# Patient Record
Sex: FEMALE | Race: BLACK OR AFRICAN AMERICAN | NOT HISPANIC OR LATINO | Employment: FULL TIME | URBAN - METROPOLITAN AREA
[De-identification: names, ages, dates, MRNs, and addresses within clinical notes are randomized per-mention and may not be internally consistent; named-entity substitution may affect disease eponyms.]

---

## 2017-05-08 ENCOUNTER — HOSPITAL ENCOUNTER (EMERGENCY)
Facility: HOSPITAL | Age: 37
Discharge: HOME | End: 2017-05-08
Attending: EMERGENCY MEDICINE
Payer: MEDICAID

## 2017-05-08 VITALS
OXYGEN SATURATION: 99 % | SYSTOLIC BLOOD PRESSURE: 164 MMHG | BODY MASS INDEX: 26.96 KG/M2 | RESPIRATION RATE: 18 BRPM | DIASTOLIC BLOOD PRESSURE: 72 MMHG | HEART RATE: 110 BPM | WEIGHT: 182 LBS | HEIGHT: 69 IN | TEMPERATURE: 99 F

## 2017-05-08 DIAGNOSIS — S82.891A ANKLE FRACTURE, RIGHT, CLOSED, INITIAL ENCOUNTER: Primary | ICD-10-CM

## 2017-05-08 PROCEDURE — 96372 THER/PROPH/DIAG INJ SC/IM: CPT

## 2017-05-08 PROCEDURE — 63600175 PHARM REV CODE 636 W HCPCS: Performed by: PHYSICIAN ASSISTANT

## 2017-05-08 PROCEDURE — 99283 EMERGENCY DEPT VISIT LOW MDM: CPT | Mod: 25

## 2017-05-08 RX ORDER — HYDROMORPHONE HYDROCHLORIDE 2 MG/ML
1 INJECTION, SOLUTION INTRAMUSCULAR; INTRAVENOUS; SUBCUTANEOUS
Status: COMPLETED | OUTPATIENT
Start: 2017-05-08 | End: 2017-05-08

## 2017-05-08 RX ADMIN — HYDROMORPHONE HYDROCHLORIDE 1 MG: 2 INJECTION INTRAMUSCULAR; INTRAVENOUS; SUBCUTANEOUS at 08:05

## 2017-05-08 NOTE — ED AVS SNAPSHOT
[facility]  [address] [place] [address]               Gina N Headland   2017  7:50 PM   ED    Description:  Female : 1980   Department:  [facility]           Your Care was Coordinated By:     Provider Role From To    Dillon Martin MD Attending Provider 17 --    Darcy Rivera PA-C Physician Assistant 17 --      Reason for Visit     Broken right ankle pain worse           Diagnoses this Visit        Comments    Ankle fracture, right, closed, initial encounter    -  Primary       ED Disposition     None           To Do List           Follow-up Information     Schedule an appointment as soon as possible for a visit with [facility].    Specialties:  Neurosurgery, Plastic Surgery, Podiatry, Surgical Oncology, Allergy, Cardiothoracic Surgery, Otolaryngology, Gastroenterology, Breast Surgery, Oral Surgery, Oral and Maxillofacial Surgery, Cardiology, Bariatrics, Internal Medicine, Family Medicine, Colon and Rectal Surgery, Dental General Practice, Gynecology, Orthopedic Surgery, Genetics, Endocrinology, Vascular Surgery, Physical Medicine and Rehabilitation, Urology, Neurology, Dermatology, Rheumatology    Why:  for follow up with Orthopedic Surgery     Contact information:     [facility] [address]  [phone]          Go to [facility].    Specialty:  Emergency Medicine    Why:  As needed, If symptoms worsen    Contact information:    [address] [place] [id number] [phone]      [facility]     [facility] Nurse Care Line - 24/ Assistance  Unless otherwise directed by your provider, please contact [facility], our nurse care line that is available for 24/7 assistance.     Registered nurses in the [facility] provide: appointment scheduling, clinical advisement, health education, and other advisory  "services.  Call: [phone] (toll free)               Medications           Message regarding Medications     Verify the changes and/or additions to your medication regime listed below are the same as discussed with your clinician today.  If any of these changes or additions are incorrect, please notify your healthcare provider.        These medications were administered today        Dose Freq    hydromorphone (PF) injection 1 mg 1 mg ED 1 Time    Sig: Inject 0.5 mLs (1 mg total) into the muscle ED 1 Time.    Class: Normal    Route: Intramuscular    Cosign for Ordering: Required by Dillon Martin MD           Verify that the below list of medications is an accurate representation of the medications you are currently taking.  If none reported, the list may be blank. If incorrect, please contact your healthcare provider. Carry this list with you in case of emergency.           Current Medications     hydromorphone (PF) injection 1 mg Inject 0.5 mLs (1 mg total) into the muscle ED 1 Time.    ibuprofen (ADVIL,MOTRIN) 600 MG tablet Take 1 tablet (600 mg total) by mouth every 6 (six) hours as needed.    oxycodone-acetaminophen (PERCOCET) 5-325 mg per tablet Take 1 tablet by mouth every 6 (six) hours as needed for Pain.    PNV with Ca,no.71-iron-FA 27-1 mg Tab Take 1 tablet by mouth once daily.           Clinical Reference Information           Your Vitals Were     BP Pulse Temp Resp Height Weight    164/72 (BP Location: Left arm, Patient Position: Sitting) 110 98.6 °F (37 °C) (Oral) 18 5' 9" (1.753 m) 82.6 kg (182 lb)    Last Period SpO2 BMI          04/21/2017 99% 26.88 kg/m2        Allergies as of 5/8/2017     No Known Allergies      Immunizations Administered on Date of Encounter - 5/8/2017     None      ED Micro, Lab, POCT     None      ED Imaging Orders     None        Discharge Instructions               Discharge References/Attachments     ANKLE FRACTURE, UNDERSTANDING (ENGLISH)    ANKLE FRACTURES, TREATING " (ENGLISH)      [facility] Sign-Up     Activating your [facility] account is as easy as 1-2-3!     1) Visit [email or web address], select Sign Up Now, enter this activation code and your date of birth, then select Next.  [email or web address]  Expires: 6/22/2017  8:15 PM      2) Create a username and password to use when you visit [facility] in the future and select a security question in case you lose your password and select Next.    3) Enter your e-mail address and click Sign Up!    Additional Information  If you have questions, please e-mail [email or web address] or call [phone] to talk to our [facility] staff. Remember, [facility] is NOT to be used for urgent needs. For medical emergencies, dial 911.          [facility] complies with applicable Federal civil rights laws and does not discriminate on the basis of race, color, national origin, age, disability, or sex.        Language Assistance Services     ATTENTION: Language assistance services are available, free of charge. Please call [phone].      ATENCIÓN: Si habla español, tiene a longoria disposición servicios gratuitos de asistencia lingüística. Llame al [phone].     CHÚ Ý: N?u b?n nói Ti?ng Vi?t, có các d?ch v? h? tr? ngôn ng? mi?n phí dành cho b?n. G?i s? [phone].

## 2017-05-09 NOTE — ED PROVIDER NOTES
"Encounter Date: 2017    SCRIBE #1 NOTE: I, Almita Toure, am scribing for, and in the presence of,  Darcy Rivera PA-C. I have scribed the following portions of the note - Other sections scribed: HPI/ROS.       History     Chief Complaint   Patient presents with    Broken right ankle pain worse     Pt reports she was seen at  on Sat and diagnosed with fx ankle. "Can't get in to see ortho yet she reports and out of pain med, and pain has worsened today" Short leg ortho glass splint in place to right leg     Review of patient's allergies indicates:  No Known Allergies  HPI Comments: CC: Ankle Pain    HPI: This 36 y.o. F who has history of HTN presents to the ED for evaluation of constant right ankle pain s/p ankle fracture for 3 days. The pt fell down the stairs 2 days ago and was diagnosed with an ankle fracture at [facility]. The pt reports intermittent swelling and muscle tightening in her right lower leg. She currenlty denies the swelling and tightening. The pt notes that she can wiggle her toes and has tactile sensation in her toes. She attempted treatment with the pain medication that she was prescribed at [facility] with no relief. The pt denies fever, chills, N/V/D, and any other associated symptoms.     The history is provided by the patient. No  was used.     Past Medical History:   Diagnosis Date    Hypertension      Past Surgical History:   Procedure Laterality Date     SECTION       Family History   Problem Relation Age of Onset    Cancer Mother     Breast cancer Neg Hx     Colon cancer Neg Hx     Ovarian cancer Neg Hx      Social History   Substance Use Topics    Smoking status: Never Smoker    Smokeless tobacco: None    Alcohol use No     Review of Systems   Constitutional: Negative for chills and fever.   HENT: Negative for sore throat.    Respiratory: Negative for shortness of breath.    Cardiovascular: Negative for chest pain.   Gastrointestinal: Negative for " abdominal pain, diarrhea, nausea and vomiting.   Genitourinary: Negative for dysuria.   Musculoskeletal: Positive for arthralgias.        (+) R ankle pain   Skin: Negative for rash.   Neurological: Negative for weakness and numbness.   Hematological: Does not bruise/bleed easily.       Physical Exam   Initial Vitals   BP Pulse Resp Temp SpO2   05/08/17 1930 05/08/17 1930 05/08/17 1930 05/08/17 1930 05/08/17 1930   164/72 110 18 98.6 °F (37 °C) 99 %     Physical Exam    Constitutional: She appears well-developed and well-nourished. No distress.   HENT:   Right Ear: External ear normal.   Left Ear: External ear normal.   Cardiovascular:   Normal capillary refill   Musculoskeletal:   Splint in place to right leg. Pt has leg propped up on chair. Pain worse with movement. She is able to move her toes.   Neurological: She is alert. No sensory deficit.   Skin: Skin is warm and dry.         ED Course   Procedures  Labs Reviewed - No data to display          Medical Decision Making:   Initial Assessment:   Pt is a 37 y/o female who presents for medication request after diagnosis of trimalleolar fracture at . Pt is afebrile in NAD. ON exam, short leg splint in place to right leg. Pt has leg propped up in chair. Pain worse with movement. Pt able to move toes. Cap refill normal. I considered but doubt compartment syndrome. Pt given Dilaudid in ED. Discussed with pt that we could not refill her pain meds prescribed by other hospital. I faxed pt's paperwork to [place] at her request so that she can have follow up with orthopedics. Follow up in 2 days for re-evaluation with [facility] ortho. Return to ER if symptoms worsen or as needed.    I discussed this pt with Dr. Martin who also saw pt face to face and he agrees with assessment and plan.             Scribe Attestation:   Scribe #1: I performed the above scribed service and the documentation accurately describes the services I performed. I attest to the accuracy of the  note.    Attending Attestation:     Physician Attestation Statement for NP/PA:   I have conducted a face to face encounter with this patient in addition to the NP/PA, due to    Other NP/PA Attestation Additions:     Physical Exam: This patient has pain in her right ankle.  The right ankle is splinted.  There is a good sterile and posterior splint in place.  The patient has good capillary refill in her toes.  The patient is comfortable with her leg on a chair.  She has crutches.   Medical Decision Making:  Has pain in her right ankle.  She has a known trimalleolar fracture.  We will try to help her get orthopedic follow-up at [place].  I doubt ischemia.  I doubt compartment syndrome.  The patient is comfortable during my portion of the exam.       Physician Attestation for Scribe:  Physician Attestation Statement for Scribe #1: I, Darcy Rivera PA-C, reviewed documentation, as scribed by Almita Toure in my presence, and it is both accurate and complete.                 ED Course     Clinical Impression:   The encounter diagnosis was Ankle fracture, right, closed, initial encounter.          Darcy Rivera PA-C  05/10/17 8425       Dillon Martin MD  05/11/17 6411

## 2017-05-09 NOTE — ED TRIAGE NOTES
Pt presents with pain to the Rt foot with splint in place.  Able to wiggle toes.  C/O pain to the RLE states that pain is constant.  Noted good capillary refill to toes.  Pt rates pain as 10.

## 2023-02-13 ENCOUNTER — HOSPITAL ENCOUNTER (INPATIENT)
Facility: HOSPITAL | Age: 43
LOS: 3 days | Discharge: HOME | DRG: 690 | End: 2023-02-16
Attending: STUDENT IN AN ORGANIZED HEALTH CARE EDUCATION/TRAINING PROGRAM | Admitting: EMERGENCY MEDICINE
Payer: MEDICAID

## 2023-02-13 DIAGNOSIS — R50.9 FEVER: ICD-10-CM

## 2023-02-13 DIAGNOSIS — R65.20 SEVERE SEPSIS: Primary | ICD-10-CM

## 2023-02-13 DIAGNOSIS — N12 PYELONEPHRITIS: ICD-10-CM

## 2023-02-13 DIAGNOSIS — R07.9 CHEST PAIN: ICD-10-CM

## 2023-02-13 DIAGNOSIS — A41.9 SEVERE SEPSIS: Primary | ICD-10-CM

## 2023-02-13 DIAGNOSIS — R05.9 COUGH: ICD-10-CM

## 2023-02-13 PROBLEM — N92.0 MENORRHAGIA: Status: ACTIVE | Noted: 2023-02-13

## 2023-02-13 PROBLEM — D50.9 IRON DEFICIENCY ANEMIA: Status: ACTIVE | Noted: 2023-02-13

## 2023-02-13 LAB
A1 AG RBC QL: NORMAL
ABO + RH BLD: NORMAL
ALBUMIN SERPL BCP-MCNC: 3.8 G/DL (ref 3.5–5.2)
ALLENS TEST: NORMAL
ALP SERPL-CCNC: 71 U/L (ref 55–135)
ALT SERPL W/O P-5'-P-CCNC: 22 U/L (ref 10–44)
ANION GAP SERPL CALC-SCNC: 17 MMOL/L (ref 8–16)
ANISOCYTOSIS BLD QL SMEAR: ABNORMAL
ANISOCYTOSIS BLD QL SMEAR: SLIGHT
AST SERPL-CCNC: 33 U/L (ref 10–40)
B-HCG UR QL: NEGATIVE
BACTERIA #/AREA URNS HPF: ABNORMAL /HPF
BASOPHILS # BLD AUTO: 0.02 K/UL (ref 0–0.2)
BASOPHILS NFR BLD: 0 % (ref 0–1.9)
BASOPHILS NFR BLD: 0.1 % (ref 0–1.9)
BILIRUB SERPL-MCNC: 0.8 MG/DL (ref 0.1–1)
BILIRUB UR QL STRIP: NEGATIVE
BLD GP AB SCN CELLS X3 SERPL QL: NORMAL
BLD PROD TYP BPU: NORMAL
BLOOD UNIT EXPIRATION DATE: NORMAL
BLOOD UNIT TYPE CODE: 6200
BLOOD UNIT TYPE: NORMAL
BUN SERPL-MCNC: 15 MG/DL (ref 6–20)
CALCIUM SERPL-MCNC: 9.3 MG/DL (ref 8.7–10.5)
CHLORIDE SERPL-SCNC: 103 MMOL/L (ref 95–110)
CK SERPL-CCNC: 67 U/L (ref 20–180)
CLARITY UR: ABNORMAL
CO2 SERPL-SCNC: 16 MMOL/L (ref 23–29)
CODING SYSTEM: NORMAL
COLOR UR: YELLOW
CREAT SERPL-MCNC: 1.4 MG/DL (ref 0.5–1.4)
CROSSMATCH INTERPRETATION: NORMAL
CTP QC/QA: YES
DACRYOCYTES BLD QL SMEAR: ABNORMAL
DAT IGG-SP REAG RBC-IMP: NORMAL
DIFFERENTIAL METHOD: ABNORMAL
DIFFERENTIAL METHOD: ABNORMAL
DISPENSE STATUS: NORMAL
EOSINOPHIL # BLD AUTO: 0 K/UL (ref 0–0.5)
EOSINOPHIL NFR BLD: 0 % (ref 0–8)
EOSINOPHIL NFR BLD: 0 % (ref 0–8)
ERYTHROCYTE [DISTWIDTH] IN BLOOD BY AUTOMATED COUNT: 21.7 % (ref 11.5–14.5)
ERYTHROCYTE [DISTWIDTH] IN BLOOD BY AUTOMATED COUNT: 21.7 % (ref 11.5–14.5)
EST. GFR  (NO RACE VARIABLE): 48 ML/MIN/1.73 M^2
GLUCOSE SERPL-MCNC: 90 MG/DL (ref 70–110)
GLUCOSE UR QL STRIP: NEGATIVE
HCT VFR BLD AUTO: 19 % (ref 37–48.5)
HCT VFR BLD AUTO: 25 % (ref 37–48.5)
HGB BLD-MCNC: 5 G/DL (ref 12–16)
HGB BLD-MCNC: 6.5 G/DL (ref 12–16)
HGB UR QL STRIP: ABNORMAL
HYALINE CASTS #/AREA URNS LPF: 8 /LPF
HYPOCHROMIA BLD QL SMEAR: ABNORMAL
HYPOCHROMIA BLD QL SMEAR: ABNORMAL
IMM GRANULOCYTES # BLD AUTO: 0.1 K/UL (ref 0–0.04)
IMM GRANULOCYTES # BLD AUTO: ABNORMAL K/UL (ref 0–0.04)
IMM GRANULOCYTES NFR BLD AUTO: 0.6 % (ref 0–0.5)
IMM GRANULOCYTES NFR BLD AUTO: ABNORMAL % (ref 0–0.5)
IRON SERPL-MCNC: <10 UG/DL (ref 30–160)
KETONES UR QL STRIP: NEGATIVE
LACTATE SERPL-SCNC: 1.1 MMOL/L (ref 0.5–2.2)
LACTATE SERPL-SCNC: 1.2 MMOL/L (ref 0.5–2.2)
LDH SERPL L TO P-CCNC: 0.61 MMOL/L (ref 0.5–2.2)
LDH SERPL L TO P-CCNC: 198 U/L (ref 110–260)
LEUKOCYTE ESTERASE UR QL STRIP: ABNORMAL
LYMPHOCYTES # BLD AUTO: 0.9 K/UL (ref 1–4.8)
LYMPHOCYTES NFR BLD: 5.6 % (ref 18–48)
LYMPHOCYTES NFR BLD: 6 % (ref 18–48)
MCH RBC QN AUTO: 16.4 PG (ref 27–31)
MCH RBC QN AUTO: 16.5 PG (ref 27–31)
MCHC RBC AUTO-ENTMCNC: 26 G/DL (ref 32–36)
MCHC RBC AUTO-ENTMCNC: 26.3 G/DL (ref 32–36)
MCV RBC AUTO: 62 FL (ref 82–98)
MCV RBC AUTO: 64 FL (ref 82–98)
METAMYELOCYTES NFR BLD MANUAL: 2 %
MICROSCOPIC COMMENT: ABNORMAL
MONOCYTES # BLD AUTO: 1.1 K/UL (ref 0.3–1)
MONOCYTES NFR BLD: 0 % (ref 4–15)
MONOCYTES NFR BLD: 6.7 % (ref 4–15)
NEUTROPHILS # BLD AUTO: 14.1 K/UL (ref 1.8–7.7)
NEUTROPHILS NFR BLD: 87 % (ref 38–73)
NEUTROPHILS NFR BLD: 88 % (ref 38–73)
NEUTS BAND NFR BLD MANUAL: 4 %
NITRITE UR QL STRIP: NEGATIVE
NRBC BLD-RTO: 0 /100 WBC
NRBC BLD-RTO: 0 /100 WBC
NUM UNITS TRANS PACKED RBC: NORMAL
OVALOCYTES BLD QL SMEAR: ABNORMAL
OVALOCYTES BLD QL SMEAR: ABNORMAL
PH UR STRIP: 6 [PH] (ref 5–8)
PLATELET # BLD AUTO: 244 K/UL (ref 150–450)
PLATELET # BLD AUTO: 295 K/UL (ref 150–450)
PLATELET BLD QL SMEAR: ABNORMAL
PLATELET BLD QL SMEAR: ABNORMAL
PMV BLD AUTO: 10 FL (ref 9.2–12.9)
PMV BLD AUTO: 9.7 FL (ref 9.2–12.9)
POC MOLECULAR INFLUENZA A AGN: NEGATIVE
POC MOLECULAR INFLUENZA B AGN: NEGATIVE
POIKILOCYTOSIS BLD QL SMEAR: ABNORMAL
POIKILOCYTOSIS BLD QL SMEAR: SLIGHT
POLYCHROMASIA BLD QL SMEAR: ABNORMAL
POLYCHROMASIA BLD QL SMEAR: ABNORMAL
POTASSIUM SERPL-SCNC: 3.8 MMOL/L (ref 3.5–5.1)
PROCALCITONIN SERPL IA-MCNC: 6.51 NG/ML
PROT SERPL-MCNC: 9.2 G/DL (ref 6–8.4)
PROT UR QL STRIP: ABNORMAL
RBC # BLD AUTO: 3.05 M/UL (ref 4–5.4)
RBC # BLD AUTO: 3.93 M/UL (ref 4–5.4)
RBC #/AREA URNS HPF: 3 /HPF (ref 0–4)
RETICS/RBC NFR AUTO: 1 % (ref 0.5–2.5)
SAMPLE: NORMAL
SARS-COV-2 RDRP RESP QL NAA+PROBE: NEGATIVE
SATURATED IRON: ABNORMAL % (ref 20–50)
SITE: NORMAL
SODIUM SERPL-SCNC: 136 MMOL/L (ref 136–145)
SP GR UR STRIP: >1.03 (ref 1–1.03)
TARGETS BLD QL SMEAR: ABNORMAL
TOTAL IRON BINDING CAPACITY: 459 UG/DL (ref 250–450)
TRANSFERRIN SERPL-MCNC: 310 MG/DL (ref 200–375)
URN SPEC COLLECT METH UR: ABNORMAL
UROBILINOGEN UR STRIP-ACNC: NEGATIVE EU/DL
WBC # BLD AUTO: 16.19 K/UL (ref 3.9–12.7)
WBC # BLD AUTO: 25.61 K/UL (ref 3.9–12.7)
WBC #/AREA URNS HPF: 46 /HPF (ref 0–5)
WBC CLUMPS URNS QL MICRO: ABNORMAL

## 2023-02-13 PROCEDURE — 83605 ASSAY OF LACTIC ACID: CPT

## 2023-02-13 PROCEDURE — 25000003 PHARM REV CODE 250

## 2023-02-13 PROCEDURE — 81025 URINE PREGNANCY TEST: CPT | Performed by: EMERGENCY MEDICINE

## 2023-02-13 PROCEDURE — 87040 BLOOD CULTURE FOR BACTERIA: CPT | Mod: 59

## 2023-02-13 PROCEDURE — 85025 COMPLETE CBC W/AUTO DIFF WBC: CPT | Performed by: STUDENT IN AN ORGANIZED HEALTH CARE EDUCATION/TRAINING PROGRAM

## 2023-02-13 PROCEDURE — 63600175 PHARM REV CODE 636 W HCPCS

## 2023-02-13 PROCEDURE — 83605 ASSAY OF LACTIC ACID: CPT | Mod: 91

## 2023-02-13 PROCEDURE — 99285 EMERGENCY DEPT VISIT HI MDM: CPT | Mod: 25

## 2023-02-13 PROCEDURE — 93010 EKG 12-LEAD: ICD-10-PCS | Mod: ,,, | Performed by: INTERNAL MEDICINE

## 2023-02-13 PROCEDURE — 84145 PROCALCITONIN (PCT): CPT | Performed by: EMERGENCY MEDICINE

## 2023-02-13 PROCEDURE — 25000003 PHARM REV CODE 250: Performed by: STUDENT IN AN ORGANIZED HEALTH CARE EDUCATION/TRAINING PROGRAM

## 2023-02-13 PROCEDURE — 96361 HYDRATE IV INFUSION ADD-ON: CPT

## 2023-02-13 PROCEDURE — 86920 COMPATIBILITY TEST SPIN: CPT | Performed by: STUDENT IN AN ORGANIZED HEALTH CARE EDUCATION/TRAINING PROGRAM

## 2023-02-13 PROCEDURE — 82607 VITAMIN B-12: CPT | Performed by: EMERGENCY MEDICINE

## 2023-02-13 PROCEDURE — 86900 BLOOD TYPING SEROLOGIC ABO: CPT | Performed by: EMERGENCY MEDICINE

## 2023-02-13 PROCEDURE — 83615 LACTATE (LD) (LDH) ENZYME: CPT | Performed by: EMERGENCY MEDICINE

## 2023-02-13 PROCEDURE — 85007 BL SMEAR W/DIFF WBC COUNT: CPT | Performed by: NURSE PRACTITIONER

## 2023-02-13 PROCEDURE — 86905 BLOOD TYPING RBC ANTIGENS: CPT | Performed by: EMERGENCY MEDICINE

## 2023-02-13 PROCEDURE — 82550 ASSAY OF CK (CPK): CPT | Performed by: EMERGENCY MEDICINE

## 2023-02-13 PROCEDURE — 86880 COOMBS TEST DIRECT: CPT | Performed by: EMERGENCY MEDICINE

## 2023-02-13 PROCEDURE — 21400001 HC TELEMETRY ROOM

## 2023-02-13 PROCEDURE — 99900035 HC TECH TIME PER 15 MIN (STAT)

## 2023-02-13 PROCEDURE — 63600175 PHARM REV CODE 636 W HCPCS: Mod: TB,JG | Performed by: STUDENT IN AN ORGANIZED HEALTH CARE EDUCATION/TRAINING PROGRAM

## 2023-02-13 PROCEDURE — 87502 INFLUENZA DNA AMP PROBE: CPT

## 2023-02-13 PROCEDURE — 85045 AUTOMATED RETICULOCYTE COUNT: CPT | Performed by: EMERGENCY MEDICINE

## 2023-02-13 PROCEDURE — 36415 COLL VENOUS BLD VENIPUNCTURE: CPT

## 2023-02-13 PROCEDURE — 85027 COMPLETE CBC AUTOMATED: CPT | Performed by: NURSE PRACTITIONER

## 2023-02-13 PROCEDURE — 86920 COMPATIBILITY TEST SPIN: CPT | Performed by: EMERGENCY MEDICINE

## 2023-02-13 PROCEDURE — 93005 ELECTROCARDIOGRAM TRACING: CPT

## 2023-02-13 PROCEDURE — 87086 URINE CULTURE/COLONY COUNT: CPT

## 2023-02-13 PROCEDURE — 96374 THER/PROPH/DIAG INJ IV PUSH: CPT

## 2023-02-13 PROCEDURE — 84466 ASSAY OF TRANSFERRIN: CPT | Performed by: EMERGENCY MEDICINE

## 2023-02-13 PROCEDURE — 25500020 PHARM REV CODE 255: Performed by: STUDENT IN AN ORGANIZED HEALTH CARE EDUCATION/TRAINING PROGRAM

## 2023-02-13 PROCEDURE — 82746 ASSAY OF FOLIC ACID SERUM: CPT | Performed by: EMERGENCY MEDICINE

## 2023-02-13 PROCEDURE — P9016 RBC LEUKOCYTES REDUCED: HCPCS | Performed by: EMERGENCY MEDICINE

## 2023-02-13 PROCEDURE — 81000 URINALYSIS NONAUTO W/SCOPE: CPT

## 2023-02-13 PROCEDURE — 93010 ELECTROCARDIOGRAM REPORT: CPT | Mod: ,,, | Performed by: INTERNAL MEDICINE

## 2023-02-13 PROCEDURE — 80053 COMPREHEN METABOLIC PANEL: CPT

## 2023-02-13 PROCEDURE — 25000003 PHARM REV CODE 250: Performed by: NURSE PRACTITIONER

## 2023-02-13 PROCEDURE — 86920 COMPATIBILITY TEST SPIN: CPT | Performed by: PHYSICIAN ASSISTANT

## 2023-02-13 RX ORDER — SODIUM CHLORIDE 9 MG/ML
INJECTION, SOLUTION INTRAVENOUS CONTINUOUS
Status: ACTIVE | OUTPATIENT
Start: 2023-02-13 | End: 2023-02-14

## 2023-02-13 RX ORDER — GLUCAGON 1 MG
1 KIT INJECTION
Status: DISCONTINUED | OUTPATIENT
Start: 2023-02-13 | End: 2023-02-16 | Stop reason: HOSPADM

## 2023-02-13 RX ORDER — NALOXONE HCL 0.4 MG/ML
0.02 VIAL (ML) INJECTION
Status: DISCONTINUED | OUTPATIENT
Start: 2023-02-13 | End: 2023-02-16 | Stop reason: HOSPADM

## 2023-02-13 RX ORDER — ACETAMINOPHEN 500 MG
500 TABLET ORAL
Status: COMPLETED | OUTPATIENT
Start: 2023-02-13 | End: 2023-02-13

## 2023-02-13 RX ORDER — ACETAMINOPHEN 325 MG/1
650 TABLET ORAL EVERY 8 HOURS PRN
Status: DISCONTINUED | OUTPATIENT
Start: 2023-02-13 | End: 2023-02-16 | Stop reason: HOSPADM

## 2023-02-13 RX ORDER — HYDROCODONE BITARTRATE AND ACETAMINOPHEN 500; 5 MG/1; MG/1
TABLET ORAL
Status: DISCONTINUED | OUTPATIENT
Start: 2023-02-13 | End: 2023-02-16

## 2023-02-13 RX ORDER — CEFEPIME HYDROCHLORIDE 1 G/50ML
2 INJECTION, SOLUTION INTRAVENOUS
Status: DISCONTINUED | OUTPATIENT
Start: 2023-02-13 | End: 2023-02-16 | Stop reason: HOSPADM

## 2023-02-13 RX ORDER — ACETAMINOPHEN 325 MG/1
650 TABLET ORAL EVERY 6 HOURS PRN
COMMUNITY
End: 2023-02-13

## 2023-02-13 RX ORDER — IBUPROFEN 200 MG
16 TABLET ORAL
Status: DISCONTINUED | OUTPATIENT
Start: 2023-02-13 | End: 2023-02-16 | Stop reason: HOSPADM

## 2023-02-13 RX ORDER — IBUPROFEN 200 MG
24 TABLET ORAL
Status: DISCONTINUED | OUTPATIENT
Start: 2023-02-13 | End: 2023-02-16 | Stop reason: HOSPADM

## 2023-02-13 RX ORDER — POLYETHYLENE GLYCOL 3350 17 G/17G
17 POWDER, FOR SOLUTION ORAL 2 TIMES DAILY PRN
Status: DISCONTINUED | OUTPATIENT
Start: 2023-02-13 | End: 2023-02-16 | Stop reason: HOSPADM

## 2023-02-13 RX ORDER — KETOROLAC TROMETHAMINE 30 MG/ML
15 INJECTION, SOLUTION INTRAMUSCULAR; INTRAVENOUS
Status: COMPLETED | OUTPATIENT
Start: 2023-02-13 | End: 2023-02-13

## 2023-02-13 RX ORDER — TALC
6 POWDER (GRAM) TOPICAL NIGHTLY
Status: DISCONTINUED | OUTPATIENT
Start: 2023-02-13 | End: 2023-02-16 | Stop reason: HOSPADM

## 2023-02-13 RX ORDER — IBUPROFEN 600 MG/1
600 TABLET ORAL
Status: DISCONTINUED | OUTPATIENT
Start: 2023-02-13 | End: 2023-02-13

## 2023-02-13 RX ORDER — ACETAMINOPHEN, DEXTROMETHORPHAN HYDROBROMIDE, GUAIFENESIN, AND PHENYLEPHRINE HYDROCHLORIDE 325; 10; 200; 5 MG/1; MG/1; MG/1; MG/1
2 TABLET, FILM COATED ORAL EVERY 4 HOURS
Status: ON HOLD | COMMUNITY
End: 2023-02-16 | Stop reason: HOSPADM

## 2023-02-13 RX ADMIN — CEFEPIME HYDROCHLORIDE 2 G: 2 INJECTION, SOLUTION INTRAVENOUS at 03:02

## 2023-02-13 RX ADMIN — KETOROLAC TROMETHAMINE 15 MG: 30 INJECTION, SOLUTION INTRAMUSCULAR; INTRAVENOUS at 01:02

## 2023-02-13 RX ADMIN — Medication 6 MG: at 08:02

## 2023-02-13 RX ADMIN — SODIUM CHLORIDE 1000 ML: 9 INJECTION, SOLUTION INTRAVENOUS at 03:02

## 2023-02-13 RX ADMIN — ACETAMINOPHEN 650 MG: 325 TABLET ORAL at 09:02

## 2023-02-13 RX ADMIN — IOHEXOL 75 ML: 350 INJECTION, SOLUTION INTRAVENOUS at 03:02

## 2023-02-13 RX ADMIN — VANCOMYCIN HYDROCHLORIDE 1500 MG: 1.5 INJECTION, POWDER, LYOPHILIZED, FOR SOLUTION INTRAVENOUS at 02:02

## 2023-02-13 RX ADMIN — SODIUM CHLORIDE: 9 INJECTION, SOLUTION INTRAVENOUS at 08:02

## 2023-02-13 RX ADMIN — SODIUM CHLORIDE 1000 ML: 9 INJECTION, SOLUTION INTRAVENOUS at 01:02

## 2023-02-13 RX ADMIN — ACETAMINOPHEN 500 MG: 500 TABLET ORAL at 01:02

## 2023-02-13 NOTE — HPI
Gina Waller is a 42 y.o. female who has a past medical history of Hypertension, menorrhagia, iron deficiency anemia come presented to the ED with CC of Fever. She complains of Flu like Symptoms- Fever, chills, myalgias, back pain, cold sweats, cough, rhinorrhea. She denies CP, AP, or SOB.  Patient has been taking Tylenol for fever, which helps temporarily.  Her friend is sick with an upper respiratory infection, with unknown microorganism.  Patient is COVID and flu negative in the emergency room.  She has 103 fever while in the ED. Urine pregnancy test is negative, patient states that she is on her menstrual cycle now and she has heavy bleeding.  Hemoglobin 6.5 on admission.  1 unit of blood ordered, and anemia labs ordered.  WBC count 26 K, patient started on broad-spectrum antibiotics, unknown source at this time.  Denies urinary symptoms.  Denies green sputum.  CTA performed in the ED, no signs of PE or infection.  Patient has 4/4 sirs: 103.2 F, , RR 22, WBC 26 K

## 2023-02-13 NOTE — ASSESSMENT & PLAN NOTE
This patient does not have evidence of infective focus  My overall impression is sepsis.  Source: Unknown  Antibiotics given-   Antibiotics (72h ago, onward)    Start     Stop Route Frequency Ordered    02/14/23 1500  vancomycin 1,250 mg in dextrose 5 % (D5W) 250 mL IVPB (Vial-Mate)         -- IV Every 24 hours (non-standard times) 02/13/23 1555    02/13/23 1530  cefepime in dextrose 5 % IVPB 2 g         -- IV Every 8 hours (non-standard times) 02/13/23 1424    02/13/23 1523  vancomycin - pharmacy to dose  (vancomycin IVPB)        [facility].    -- IV pharmacy to manage frequency 02/13/23 1424        Latest lactate reviewed-  Recent Labs   Lab 02/13/23  1420   LACTATE 1.1       Will Not start Pressors- Levophed for MAP of 65  Source control achieved by: ABx & fluid  · Patient has 4/4 sirs:  103.2 F , RR 22, WBC 26 K  · Vanc and cefepime initiated  · Sepsis fluids given  · Blood cultures pending  · UA pending  · CTA unremarkable  · Source unclear this point

## 2023-02-13 NOTE — FIRST PROVIDER EVALUATION
Emergency Department TeleTriage Encounter Note      CHIEF COMPLAINT    Chief Complaint   Patient presents with    flu like symptoms     The patient reports fevers, chills, upper and lower non traumatic back pain, sweats, a cough, runny nose, and headaches x 4 days. Patient reports taking tylenol around 0700 this morning.       VITAL SIGNS   Initial Vitals [02/13/23 1146]   BP Pulse Resp Temp SpO2   122/71 (!) 127 16 100 °F (37.8 °C) 99 %      MAP       --            ALLERGIES    Review of patient's allergies indicates:  No Known Allergies    PROVIDER TRIAGE NOTE  This is a teletriage evaluation of a 42 y.o. female presenting to the ED complaining of cough, body aches, back pain, fever, and chills for four days.  Took tylenol PTA today.  Denies CP and SOB.  Denies n/v/d.    Pt's HR 127bpm.  Pt is speaking in full sentences. Offered PO challenge or IV and pt would like IV fluids.      Will start labs, IV fluid bolus.     Initial orders will be placed and care will be transferred to an alternate provider when patient is roomed for a full evaluation. Any additional orders and the final disposition will be determined by that provider.         ORDERS  Labs Reviewed   POCT INFLUENZA A/B MOLECULAR   SARS-COV-2 RDRP GENE   POCT URINE PREGNANCY       ED Orders (720h ago, onward)      Start Ordered     Status Ordering Provider    02/13/23 1215 02/13/23 1211  sodium chloride 0.9% bolus 1,000 mL 1,000 mL  ED 1 Time         Ordered ORTEGA JARRELL N.    02/13/23 1215 02/13/23 1211  ibuprofen tablet 600 mg  ED 1 Time         Ordered ORTEGA JARRELL N.    02/13/23 1212 02/13/23 1211  CBC auto differential  STAT         Ordered ORTEGA JARRELL N.    02/13/23 1212 02/13/23 1211  Basic metabolic panel  STAT         Ordered ORTEGA JARRELL N.    02/13/23 1212 02/13/23 1211  Insert Saline lock IV  Once         Ordered ORTEGA JARRELL N.    02/13/23 1212 02/13/23 1211  Urinalysis, Reflex to Urine  Culture Urine, Clean Catch  STAT         Ordered ORTEGA JARRELL N.    02/13/23 1137 02/13/23 1136  POCT Influenza A/B Molecular  Once         Ordered JHONATAN VALLE.    02/13/23 1137 02/13/23 1136  POCT COVID-19 Rapid Screening  Once         Ordered JHONATAN VALLE    02/13/23 1137 02/13/23 1136  POCT urine pregnancy  Once         Ordered JHONATAN VALLE              Virtual Visit Note: The provider triage portion of this emergency department evaluation and documentation was performed via SECU4, a HIPAA-compliant telemedicine application, in concert with a tele-presenter in the room. A face to face patient evaluation with one of my colleagues will occur once the patient is placed in an emergency department room.      DISCLAIMER: This note was prepared with TE2 voice recognition transcription software. Garbled syntax, mangled pronouns, and other bizarre constructions may be attributed to that software system.

## 2023-02-13 NOTE — ASSESSMENT & PLAN NOTE
· Hemoglobin 6.5   · Iron undetectable  · 1 unit of blood given  · Will give 2 doses of IV iron as well  · Follow-up OBGYN, patient would benefit from birth control that would suppress her menses who began having children could consider partial hysterectomy.

## 2023-02-13 NOTE — PLAN OF CARE
[facility] - Emergency Dept  Initial Discharge Assessment       Primary Care Provider: Bigg Valdes MD    Admission Diagnosis: Cough [R05.9]    Admission Date: 2/13/2023  Expected Discharge Date:     SW completed initial assessment and discussed discharge planning with patient at her bedside. Patient stated that she lives with her 2 young children, and her sister Jose is a source of support her. Patient's sister will provide transportation for her to get home when discharge from the hospital.    Discharge Barriers Identified: None    Payor: MEDICAID / Plan: HEALTHY BLUE ([employer] [place]) / Product Type: Managed Medicaid /     Extended Emergency Contact Information  Primary Emergency Contact: Jose Hood   [facility]  Home Phone: [phone]  Relation: Sister    Discharge Plan A: Home with family  Discharge Plan B: Home with family      [facility] [place] - [address]  [place] [address]  Phone: [phone] Fax: [phone]      Initial Assessment (most recent)       Adult Discharge Assessment - 02/13/23 1509          Discharge Assessment    Assessment Type Discharge Planning Assessment     Confirmed/corrected address, phone number and insurance Yes     Confirmed Demographics Correct on Facesheet     Source of Information patient     When was your last doctors appointment? --   patient has not been to the doctor in over a year.    Communicated DANILO with patient/caregiver Date not available/Unable to determine     People in Home child(panchito), dependent     Do you expect to return to your current living situation? Yes     Do you have help at home or someone to help you manage your care at home? Yes     Who are your caregiver(s) and their phone number(s)? Jose Hood (Sister)   [phone] (Home Phone)     Prior to hospitilization cognitive status: Alert/Oriented     Current cognitive status: Alert/Oriented     Equipment Currently Used at Home none      Readmission within 30 days? No     Patient currently being followed by outpatient case management? No     Do you currently have service(s) that help you manage your care at home? No     Do you take prescription medications? No     Do you have prescription coverage? Yes     Coverage Medicaid     Do you have any problems affording any of your prescribed medications? No     Is the patient taking medications as prescribed? yes     Who is going to help you get home at discharge? Jose Hood (Sister)   [phone] (Home Phone)     How do you get to doctors appointments? car, drives self     Are you on dialysis? No     Do you take coumadin? No     Discharge Plan A Home with family     Discharge Plan B Home with family     DME Needed Upon Discharge  none     Discharge Plan discussed with: Patient     Discharge Barriers Identified None

## 2023-02-13 NOTE — SUBJECTIVE & OBJECTIVE
Past Medical History:   Diagnosis Date    Hypertension        Past Surgical History:   Procedure Laterality Date    ANKLE SURGERY Right      SECTION      x3       Review of patient's allergies indicates:  No Known Allergies    No current facility-administered medications on file prior to encounter.     Current Outpatient Medications on File Prior to Encounter   Medication Sig    zzsfrdvas-MF-fyhqseih-guaifen (TYLENOL COLD AND FLU SEVERE) 5--200 mg Tab Take 2 capsules by mouth every 4 (four) hours.    [DISCONTINUED] acetaminophen (TYLENOL) 325 MG tablet Take 650 mg by mouth every 6 (six) hours as needed for Pain.    [DISCONTINUED] ibuprofen (ADVIL,MOTRIN) 600 MG tablet Take 1 tablet (600 mg total) by mouth every 6 (six) hours as needed.    [DISCONTINUED] oxycodone-acetaminophen (PERCOCET) 5-325 mg per tablet Take 1 tablet by mouth every 6 (six) hours as needed for Pain.    [DISCONTINUED] PNV with Ca,no.71-iron-FA 27-1 mg Tab Take 1 tablet by mouth once daily.     Family History       Problem Relation (Age of Onset)    Cancer Mother          Tobacco Use    Smoking status: Never    Smokeless tobacco: Not on file   Substance and Sexual Activity    Alcohol use: Yes     Comment: 23: Socially    Drug use: No    Sexual activity: Yes     Partners: Male     Review of Systems   Constitutional:  Positive for activity change, fatigue and fever. Negative for unexpected weight change.   HENT:  Positive for congestion and rhinorrhea. Negative for trouble swallowing and voice change.    Eyes:  Negative for photophobia and visual disturbance.   Respiratory:  Positive for cough. Negative for shortness of breath.    Cardiovascular:  Negative for chest pain and leg swelling.   Gastrointestinal:  Positive for nausea. Negative for blood in stool.   Endocrine: Negative for polydipsia and polyuria.   Genitourinary:  Negative for difficulty urinating and hematuria.   Musculoskeletal:  Positive for back pain and myalgias.  Negative for neck pain and neck stiffness.   Skin:  Negative for pallor and rash.   Allergic/Immunologic: Negative for food allergies and immunocompromised state.   Neurological:  Negative for seizures and syncope.   Psychiatric/Behavioral:  Negative for agitation, behavioral problems and confusion.    Objective:     Vital Signs (Most Recent):  Temp: (S) 99.9 °F (37.7 °C) (02/13/23 1436)  Pulse: 96 (02/13/23 1517)  Resp: (!) 22 (02/13/23 1517)  BP: (!) 106/57 (02/13/23 1517)  SpO2: 100 % (02/13/23 1517)   Vital Signs (24h Range):  Temp:  [99.9 °F (37.7 °C)-103.2 °F (39.6 °C)] 99.9 °F (37.7 °C)  Pulse:  [] 96  Resp:  [16-22] 22  SpO2:  [99 %-100 %] 100 %  BP: (106-122)/(57-71) 106/57     Weight: 73.5 kg (162 lb)  Body mass index is 23.24 kg/m².    Physical Exam  Vitals and nursing note reviewed.   Constitutional:       General: She is not in acute distress.     Appearance: She is well-developed and normal weight. She is ill-appearing. She is not diaphoretic.   HENT:      Head: Normocephalic and atraumatic.      Nose: Congestion and rhinorrhea present.      Mouth/Throat:      Mouth: Mucous membranes are moist.      Pharynx: No oropharyngeal exudate.   Eyes:      General: No scleral icterus.     Pupils: Pupils are equal, round, and reactive to light.   Neck:      Thyroid: No thyromegaly.   Cardiovascular:      Rate and Rhythm: Normal rate and regular rhythm.      Heart sounds: No murmur heard.  Pulmonary:      Effort: No respiratory distress.      Breath sounds: No stridor. No wheezing or rales.   Abdominal:      General: There is no distension.      Palpations: Abdomen is soft. There is no mass.      Tenderness: There is no guarding.   Musculoskeletal:         General: No swelling or deformity. Normal range of motion.      Cervical back: Normal range of motion and neck supple. No rigidity.      Right lower leg: No edema.      Left lower leg: No edema.   Skin:     General: Skin is warm and dry.      Capillary  Refill: Capillary refill takes less than 2 seconds.      Coloration: Skin is not jaundiced.   Neurological:      General: No focal deficit present.      Mental Status: She is alert and oriented to person, place, and time.      Cranial Nerves: No cranial nerve deficit.   Psychiatric:         Mood and Affect: Mood normal.         Behavior: Behavior normal.         CRANIAL NERVES     CN III, IV, VI   Pupils are equal, round, and reactive to light.       Recent Results (from the past 24 hour(s))   POCT COVID-19 Rapid Screening    Collection Time: 02/13/23 12:25 PM   Result Value Ref Range    POC Rapid COVID Negative Negative     Acceptable Yes    POCT Influenza A/B Molecular    Collection Time: 02/13/23 12:26 PM   Result Value Ref Range    POC Molecular Influenza A Ag Negative Negative, Not Reported    POC Molecular Influenza B Ag Negative Negative, Not Reported     Acceptable Yes    CBC auto differential    Collection Time: 02/13/23 12:59 PM   Result Value Ref Range    WBC 25.61 (H) 3.90 - 12.70 K/uL    RBC 3.93 (L) 4.00 - 5.40 M/uL    Hemoglobin 6.5 (L) 12.0 - 16.0 g/dL    Hematocrit 25.0 (L) 37.0 - 48.5 %    MCV 64 (L) 82 - 98 fL    MCH 16.5 (L) 27.0 - 31.0 pg    MCHC 26.0 (L) 32.0 - 36.0 g/dL    RDW 21.7 (H) 11.5 - 14.5 %    Platelets 295 150 - 450 K/uL    MPV 9.7 9.2 - 12.9 fL    Immature Granulocytes Test Not Performed 0.0 - 0.5 %    Immature Grans (Abs) Test Not Performed 0.00 - 0.04 K/uL    nRBC 0 0 /100 WBC    Gran % 88.0 (H) 38.0 - 73.0 %    Lymph % 6.0 (L) 18.0 - 48.0 %    Mono % 0.0 (L) 4.0 - 15.0 %    Eosinophil % 0.0 0.0 - 8.0 %    Basophil % 0.0 0.0 - 1.9 %    Bands 4.0 %    Metamyelocytes 2.0 %    Platelet Estimate Appears normal     Aniso Slight     Poik Slight     Poly Occasional     Hypo Moderate     Ovalocytes Occasional     Differential Method Manual    Comprehensive metabolic panel    Collection Time: 02/13/23 12:59 PM   Result Value Ref Range    Sodium 136 136 - 145  mmol/L    Potassium 3.8 3.5 - 5.1 mmol/L    Chloride 103 95 - 110 mmol/L    CO2 16 (L) 23 - 29 mmol/L    Glucose 90 70 - 110 mg/dL    BUN 15 6 - 20 mg/dL    Creatinine 1.4 0.5 - 1.4 mg/dL    Calcium 9.3 8.7 - 10.5 mg/dL    Total Protein 9.2 (H) 6.0 - 8.4 g/dL    Albumin 3.8 3.5 - 5.2 g/dL    Total Bilirubin 0.8 0.1 - 1.0 mg/dL    Alkaline Phosphatase 71 55 - 135 U/L    AST 33 10 - 40 U/L    ALT 22 10 - 44 U/L    Anion Gap 17 (H) 8 - 16 mmol/L    eGFR 48 (A) >60 mL/min/1.73 m^2   POCT urine pregnancy    Collection Time: 02/13/23 12:59 PM   Result Value Ref Range    POC Preg Test, Ur Negative Negative     Acceptable Yes    Direct antiglobulin test    Collection Time: 02/13/23  2:18 PM   Result Value Ref Range    Direct Yesica (PARKER) NEG    Iron and TIBC    Collection Time: 02/13/23  2:18 PM   Result Value Ref Range    Iron <10 (L) 30 - 160 ug/dL    Transferrin 310 200 - 375 mg/dL    TIBC 459 (H) 250 - 450 ug/dL    Saturated Iron Unable to calculate 20 - 50 %   Lactate dehydrogenase    Collection Time: 02/13/23  2:18 PM   Result Value Ref Range     110 - 260 U/L   CK    Collection Time: 02/13/23  2:18 PM   Result Value Ref Range    CPK 67 20 - 180 U/L   Prepare RBC 1 Unit    Collection Time: 02/13/23  2:18 PM   Result Value Ref Range    UNIT NUMBER [id number]     Product Code O3055S95     DISPENSE STATUS CROSSMATCHED     CODING SYSTEM [id number]     Unit Blood Type Code 6200     Unit Blood Type A POS     Unit Expiration [id number]     CROSSMATCH INTERPRETATION Compatible    Type & Screen    Collection Time: 02/13/23  2:18 PM   Result Value Ref Range    Group & Rh A POS     Indirect Yesica NEG    Procalcitonin    Collection Time: 02/13/23  2:18 PM   Result Value Ref Range    Procalcitonin 6.51 (H) <0.25 ng/mL   A1 Typing    Collection Time: 02/13/23  2:18 PM   Result Value Ref Range    A1 Typing POS    Lactic acid, plasma #1    Collection Time: 02/13/23  2:20 PM   Result Value Ref Range    Lactate  (Lactic Acid) 1.1 0.5 - 2.2 mmol/L   Reticulocytes    Collection Time: 02/13/23  2:20 PM   Result Value Ref Range    Retic 1.0 0.5 - 2.5 %   ISTAT Lactate    Collection Time: 02/13/23  2:36 PM   Result Value Ref Range    POC Lactate 0.61 0.5 - 2.2 mmol/L    Sample VENOUS     Site Other     Allens Test N/A        Microbiology Results (last 7 days)       Procedure Component Value Units Date/Time    Blood culture x two cultures. Draw prior to antibiotics. [[id number]] Collected: 02/13/23 1421    Order Status: Sent Specimen: Blood from Peripheral, Hand, Left Updated: 02/13/23 1434    Blood culture x two cultures. Draw prior to antibiotics. [[id number]] Collected: 02/13/23 1420    Order Status: Sent Specimen: Blood from Peripheral, Wrist, Right Updated: 02/13/23 1434    Culture, Respiratory with Gram Stain [[id number]]     Order Status: No result Specimen: Respiratory     Respiratory Infection Panel (PCR), Nasopharyngeal [[id number]]     Order Status: No result Specimen: Nasopharyngeal Swab              Imaging Results              CTA Chest Non-Coronary (PE Studies) (Final result)  Result time 02/13/23 15:58:15      Final result by Everton Hamilton III, MD (02/13/23 15:58:15)                   Impression:      No evidence of pulmonary embolus or acute aortic syndrome.    No acute process seen.      Electronically signed by: Everton Hamilton MD  Date:    02/13/2023  Time:    15:58               Narrative:    EXAMINATION:  CTA CHEST NON CORONARY (PE STUDIES)    CLINICAL HISTORY:  Pulmonary embolism (PE) suspected, unknown D-dimer;    FINDINGS:  Patient was administered 75 cc of Omnipaque 350 intravenously.    The arch and great vessels are unremarkable.  Pulmonary arteries are well opacified and there is no evidence of pulmonary embolus.  No mediastinal, hilar, or axillary adenopathy is seen.  The trachea and bronchi are patent.  No emphysema, bronchiectasis, interstitial lung disease or airway trapping seen.  No pulmonary  nodules, masses, or infiltrates are seen.  Bones are unremarkable.                                       X-Ray Chest PA And Lateral (Final result)  Result time 02/13/23 13:09:07      Final result by Dillon Ritter MD (02/13/23 13:09:07)                   Impression:      See above      Electronically signed by: Dillon Ritter MD  Date:    02/13/2023  Time:    13:09               Narrative:    EXAMINATION:  XR CHEST PA AND LATERAL    CLINICAL HISTORY:  Cough, unspecified    TECHNIQUE:  PA and lateral views of the chest were performed.    COMPARISON:  None    FINDINGS:  Heart size normal.  The lungs are clear.  No pleural effusion

## 2023-02-13 NOTE — ED TRIAGE NOTES
Pt arrives to ED with flu like symptoms x4 days. Reports chills, fever, body ahces, back pain, cough, congestion, HA. Reports taking tylenol at 0700 with no relief.

## 2023-02-13 NOTE — LETTER
February 16, 2023                 [facility]  [address]  [place]  [address]  Phone: [phone]  Fax: [phone] February 16, 2023     Patient: Gina Waller   YOB: 1980       To Whom It May Concern:    Gina Waller was admitted to the hospital on 2/13/2023 12:22 PM and discharged on 2/16/2023 .  She is cleared to return to work on February 23, 2023.  If you have any questions or concerns, please don't hesitate to call Dr. Dillon Enriquez's office at [phone].      Sincerely,        Dillon Enriquez MD (e-signed SCAR)  Department of [facility]

## 2023-02-13 NOTE — PROGRESS NOTES
Pharmacokinetic Initial Assessment: IV Vancomycin    Assessment/Plan:    Initiate intravenous vancomycin with loading dose of 1500 mg once followed by a maintenance dose of vancomycin 1250 mg IV every 24 hours  Desired empiric serum trough concentration is 10 to 20 mcg/mL  Draw vancomycin trough level 60 min prior to third dose on 2/15/23 at approximately 14:00  Pharmacy will continue to follow and monitor vancomycin.      Please contact pharmacy at extension [phone] with any questions regarding this assessment.     Thank you for the consult,   Ana Kang       Patient brief summary:  Gina Waller is a 42 y.o. female initiated on antimicrobial therapy with IV Vancomycin for treatment of suspected  opportunistic infection.    Drug Allergies:   Review of patient's allergies indicates:  No Known Allergies    Actual Body Weight:   73.5 kg    Renal Function:   Estimated Creatinine Clearance: 56 mL/min (based on SCr of 1.4 mg/dL).,     Dialysis Method (if applicable):  N/A    CBC (last 72 hours):  Recent Labs   Lab Result Units 02/13/23  1259   WBC K/uL 25.61*   Hemoglobin g/dL 6.5*   Hematocrit % 25.0*   Platelets K/uL 295   Gran % % 88.0*   Lymph % % 6.0*   Mono % % 0.0*   Eosinophil % % 0.0   Basophil % % 0.0   Differential Method  Manual       Metabolic Panel (last 72 hours):  Recent Labs   Lab Result Units 02/13/23  1259   Sodium mmol/L 136   Potassium mmol/L 3.8   Chloride mmol/L 103   CO2 mmol/L 16*   Glucose mg/dL 90   BUN mg/dL 15   Creatinine mg/dL 1.4   Albumin g/dL 3.8   Total Bilirubin mg/dL 0.8   Alkaline Phosphatase U/L 71   AST U/L 33   ALT U/L 22       Drug levels (last 3 results):  No results for input(s): VANCOMYCINRA, VANCORANDOM, VANCOMYCINPE, VANCOPEAK, VANCOMYCINTR, VANCOTROUGH in the last 72 hours.    Microbiologic Results:  Microbiology Results (last 7 days)       Procedure Component Value Units Date/Time    Blood culture x two cultures. Draw prior to antibiotics. [[id number]] Collected:  02/13/23 1421    Order Status: Sent Specimen: Blood from Peripheral, Hand, Left Updated: 02/13/23 1434    Blood culture x two cultures. Draw prior to antibiotics. [[id number]] Collected: 02/13/23 1420    Order Status: Sent Specimen: Blood from Peripheral, Wrist, Right Updated: 02/13/23 1434    Culture, Respiratory with Gram Stain [[id number]]     Order Status: No result Specimen: Respiratory     Respiratory Infection Panel (PCR), Nasopharyngeal [[id number]]     Order Status: No result Specimen: Nasopharyngeal Swab

## 2023-02-13 NOTE — PHARMACY MED REC
"Admission Medication History     The home medication history was taken by Dinora Haile CPhT.      You may go to "Admission" then "Reconcile Home Medications" tabs to review and/or act upon these items.     The home medication list has been updated by the Pharmacy department.   Please read ALL comments highlighted in yellow.   Please address this information as you see fit.    Feel free to contact us if you have any questions or require assistance.      The medications listed below were removed from the home medication list. Please reorder if appropriate:  Patient reports no longer taking the following medication(s):  Advil,Motrin 600 mg tab  Percocet 5-325 mg tab  Prenatal vitamin    Medications listed below were obtained from: Patient/family and Analytic software- [employer]  (Not in a hospital admission)          Dinora Haile CPhT.  [phone]                .        "

## 2023-02-13 NOTE — ED PROVIDER NOTES
Encounter Date: 2023       History     Chief Complaint   Patient presents with    flu like symptoms     The patient reports fevers, chills, upper and lower non traumatic back pain, sweats, a cough, runny nose, and headaches x 4 days. Patient reports taking tylenol around 0700 this morning.     42-year-old female with a past medical history of hypertension presents to ED for fever.  Patient states this started on Friday.  Patient has tried Tylenol which has been helping.  Patient states her friend is sick with upper respiratory infection with no diagnosis.  Patient denies any recent travel outside [place].  Patient states she is currently on her menstrual cycle.  Patient denies any long car rides, long airplane rides, recent surgery, hemoptysis, calf swelling, and history of DVTs.  Patient admits to subjective fever, sweats, chills, runny nose, dry cough, body aches, headache, and lightheadedness.  Patient denies congestion, ear pain, sore throat, shortness breath, chest pain, visual disturbances, abdominal pain, nausea, vomiting, diarrhea constipation, urinary symptoms, dizziness, and rashes.    Review of patient's allergies indicates:  No Known Allergies  Past Medical History:   Diagnosis Date    Hypertension      Past Surgical History:   Procedure Laterality Date    ANKLE SURGERY Right      SECTION      x3     Family History   Problem Relation Age of Onset    Cancer Mother     Breast cancer Neg Hx     Colon cancer Neg Hx     Ovarian cancer Neg Hx      Social History     Tobacco Use    Smoking status: Never   Substance Use Topics    Alcohol use: Yes     Comment: 23: Socially    Drug use: No     Review of Systems   Constitutional:  Positive for chills, diaphoresis and fever (Subjective).   HENT:  Positive for rhinorrhea. Negative for congestion, ear pain and sore throat.    Eyes:  Negative for visual disturbance.   Respiratory:  Positive for cough (dry). Negative for shortness of breath.     Cardiovascular:  Negative for chest pain.   Gastrointestinal:  Negative for abdominal pain, constipation, diarrhea, nausea and vomiting.   Genitourinary:  Negative for decreased urine volume, difficulty urinating, dysuria, frequency and urgency.   Musculoskeletal:  Positive for myalgias (body aches).   Skin:  Negative for rash.   Neurological:  Positive for light-headedness and headaches. Negative for dizziness, syncope and weakness.     Physical Exam     Initial Vitals [02/13/23 1146]   BP Pulse Resp Temp SpO2   122/71 (!) 127 16 100 °F (37.8 °C) 99 %      MAP       --         Physical Exam    Nursing note and vitals reviewed.  Constitutional: She appears well-developed and well-nourished. She is not diaphoretic. She is active. She does not appear ill. No distress.   HENT:   Head: Normocephalic and atraumatic.   Right Ear: External ear normal.   Left Ear: External ear normal.   Nose: Nose normal.   Mouth/Throat: Uvula is midline, oropharynx is clear and moist and mucous membranes are normal.   Eyes: Conjunctivae, EOM and lids are normal. Pupils are equal, round, and reactive to light. Right eye exhibits no discharge. Left eye exhibits no discharge.   Neck: Neck supple.   Normal range of motion.   Full passive range of motion without pain.     Cardiovascular:  Regular rhythm.   Tachycardia present.         Pulmonary/Chest: Effort normal and breath sounds normal. No respiratory distress.   Abdominal: Abdomen is soft. She exhibits no distension. There is no abdominal tenderness.   Musculoskeletal:         General: Normal range of motion.      Cervical back: Full passive range of motion without pain, normal range of motion and neck supple.     Neurological: She is alert and oriented to person, place, and time. GCS eye subscore is 4. GCS verbal subscore is 5. GCS motor subscore is 6.   Skin: Skin is dry. Capillary refill takes less than 2 seconds.       ED Course   Procedures  Labs Reviewed   CBC W/ AUTO DIFFERENTIAL  - Abnormal; Notable for the following components:       Result Value    WBC 25.61 (*)     RBC 3.93 (*)     Hemoglobin 6.5 (*)     Hematocrit 25.0 (*)     MCV 64 (*)     MCH 16.5 (*)     MCHC 26.0 (*)     RDW 21.7 (*)     Gran % 88.0 (*)     Lymph % 6.0 (*)     Mono % 0.0 (*)     All other components within normal limits   COMPREHENSIVE METABOLIC PANEL - Abnormal; Notable for the following components:    CO2 16 (*)     Total Protein 9.2 (*)     Anion Gap 17 (*)     eGFR 48 (*)     All other components within normal limits   CULTURE, BLOOD   CULTURE, BLOOD   RESPIRATORY INFECTION PANEL (PCR), NASOPHARYNGEAL   CULTURE, RESPIRATORY   LACTIC ACID, PLASMA   RETICULOCYTES   LACTATE DEHYDROGENASE   PROCALCITONIN   FOLATE   VITAMIN B12   IRON AND TIBC   CK   PROCALCITONIN   URINALYSIS, REFLEX TO URINE CULTURE   POCT INFLUENZA A/B MOLECULAR   SARS-COV-2 RDRP GENE   POCT URINE PREGNANCY   DIRECT ANTIGLOBULIN TEST   TYPE & SCREEN   ISTAT LACTATE   PREPARE RBC SOFT     EKG Readings: (Independently Interpreted)   EKG showed sinus tachycardia with a rate of 101 beats per minute.  PA interval of 132 milliseconds.  QRS of 90 milliseconds.  QTC of 461 milliseconds.  No STEMI noted.  Signed by Dr. Martin.      Imaging Results              X-Ray Chest PA And Lateral (Final result)  Result time 02/13/23 13:09:07      Final result by Dillon Ritter MD (02/13/23 13:09:07)                   Impression:      See above      Electronically signed by: Dillon Ritter MD  Date:    02/13/2023  Time:    13:09               Narrative:    EXAMINATION:  XR CHEST PA AND LATERAL    CLINICAL HISTORY:  Cough, unspecified    TECHNIQUE:  PA and lateral views of the chest were performed.    COMPARISON:  None    FINDINGS:  Heart size normal.  The lungs are clear.  No pleural effusion                                       Medications   0.9%  NaCl infusion (for blood administration) (has no administration in time range)   sodium chloride 0.9% bolus 1,000 mL  1,000 mL (has no administration in time range)   cefepime in dextrose 5 % IVPB 2 g (has no administration in time range)   vancomycin - pharmacy to dose (has no administration in time range)   0.9%  NaCl infusion (has no administration in time range)   vancomycin 1.5 g in dextrose 5 % 250 mL IVPB (ready to mix) (1,500 mg Intravenous New Bag 2/13/23 1449)   melatonin tablet 6 mg (has no administration in time range)   polyethylene glycol packet 17 g (has no administration in time range)   acetaminophen tablet 650 mg (has no administration in time range)   naloxone 0.4 mg/mL injection 0.02 mg (has no administration in time range)   glucose chewable tablet 16 g (has no administration in time range)   glucose chewable tablet 24 g (has no administration in time range)   dextrose 10% bolus 125 mL 125 mL (has no administration in time range)   dextrose 10% bolus 250 mL 250 mL (has no administration in time range)   glucagon (human recombinant) injection 1 mg (has no administration in time range)   sodium chloride 0.9% bolus 1,000 mL 1,000 mL (0 mLs Intravenous Stopped 2/13/23 1421)   ketorolac injection 15 mg (15 mg Intravenous Given 2/13/23 1300)   acetaminophen tablet 500 mg (500 mg Oral Given 2/13/23 1326)     Medical Decision Making:   Initial Assessment:   42-year-old female with a past medical history of hypertension presents to ED for fever.    Patient's chart and medical history reviewed.  Differential Diagnosis:   COVID  Flu  Viral URI  Pneumonia   Bronchitis  PE  MI  Pleural effusion   Pneumothorax  UTI  Pyelonephritis   Clinical Tests:   Lab Tests: Ordered and Reviewed  Radiological Study: Ordered and Reviewed  Medical Tests: Ordered and Reviewed  ED Management:  Patient's vitals reviewed.  She is afebrile, no respiratory distress, nontoxic-appearing in the ED. patient tachycardia on exam, otherwise unremarkable.  UPT was negative.  Patient started on fluids and given Toradol for pain.  Patient is COVID and flu  negative.  Patient spiked a fever while in the emergency room of 103.  Patient given Tylenol.  Sepsis orders initiated. Chest x-ray showed Heart size normal.  The lungs are clear.  No pleural effusion.  EKG showed sinus tachycardia with a rate of 101 beats per minute.  FL interval of 132 milliseconds.  QRS of 90 milliseconds.  QTC of 461 milliseconds.  No STEMI noted.  Signed by Dr. Martin.  CBC showed leukocytosis of 25.61 with a anemia of 3.93 with an H&H of 6.5 and 25 respectively, she meets criteria for transfusion. Discussed this case with Dr. Jane- we will add basic anemia workup and CTA as well. Patient consented to transfusion.  Patient moved to the main side ED to be placed on a cardiac monitor.  Patient given 1 unit of blood.    Lactic acid in normal range.  CMP showed a decreased GFR of 48, otherwise unremarkable.  Blood cultures pending.  Discussed this case with Hospital Medicine Dr. Church.  We will start patient on IV antibiotics.  Patient will be admitted to inpatient for further management.                        Clinical Impression:   Final diagnoses:  [R05.9] Cough  [R50.9] Fever        ED Disposition Condition    Admit                 Alayna Holdsworth, PA-C  02/13/23 4292

## 2023-02-13 NOTE — H&P
[facility] Medicine  History & Physical    Patient Name: Gina Waller  MRN: [id number]  Patient Class: IP- Inpatient  Admission Date: 2023  Attending Physician: Mitch Church MD   Primary Care Provider: Bigg Valdes MD         Patient information was obtained from patient, past medical records and ER records.     Subjective:     Principal Problem:Severe sepsis    Chief Complaint:   Chief Complaint   Patient presents with    flu like symptoms     The patient reports fevers, chills, upper and lower non traumatic back pain, sweats, a cough, runny nose, and headaches x 4 days. Patient reports taking tylenol around 0700 this morning.        HPI:   Gina Waller is a 42 y.o. female who has a past medical history of Hypertension, menorrhagia, iron deficiency anemia come presented to the ED with CC of Fever. She complains of Flu like Symptoms- Fever, chills, myalgias, back pain, cold sweats, cough, rhinorrhea. She denies CP, AP, or SOB.  Patient has been taking Tylenol for fever, which helps temporarily.  Her friend is sick with an upper respiratory infection, with unknown microorganism.  Patient is COVID and flu negative in the emergency room.  She has 103 fever while in the ED. Urine pregnancy test is negative, patient states that she is on her menstrual cycle now and she has heavy bleeding.  Hemoglobin 6.5 on admission.  1 unit of blood ordered, and anemia labs ordered.  WBC count 26 K, patient started on broad-spectrum antibiotics, unknown source at this time.  Denies urinary symptoms.  Denies green sputum.  CTA performed in the ED, no signs of PE or infection.  Patient has 4/4 sirs: 103.2 F, , RR 22, WBC 26 K      Past Medical History:   Diagnosis Date    Hypertension        Past Surgical History:   Procedure Laterality Date    ANKLE SURGERY Right      SECTION      x3       Review of patient's allergies indicates:  No Known Allergies    No current facility-administered  medications on file prior to encounter.     Current Outpatient Medications on File Prior to Encounter   Medication Sig    zxivhtovx-KW-rfefhhey-guaifen (TYLENOL COLD AND FLU SEVERE) 5--200 mg Tab Take 2 capsules by mouth every 4 (four) hours.    [DISCONTINUED] acetaminophen (TYLENOL) 325 MG tablet Take 650 mg by mouth every 6 (six) hours as needed for Pain.    [DISCONTINUED] ibuprofen (ADVIL,MOTRIN) 600 MG tablet Take 1 tablet (600 mg total) by mouth every 6 (six) hours as needed.    [DISCONTINUED] oxycodone-acetaminophen (PERCOCET) 5-325 mg per tablet Take 1 tablet by mouth every 6 (six) hours as needed for Pain.    [DISCONTINUED] PNV with Ca,no.71-iron-FA 27-1 mg Tab Take 1 tablet by mouth once daily.     Family History       Problem Relation (Age of Onset)    Cancer Mother          Tobacco Use    Smoking status: Never    Smokeless tobacco: Not on file   Substance and Sexual Activity    Alcohol use: Yes     Comment: 2/13/23: Socially    Drug use: No    Sexual activity: Yes     Partners: Male     Review of Systems   Constitutional:  Positive for activity change, fatigue and fever. Negative for unexpected weight change.   HENT:  Positive for congestion and rhinorrhea. Negative for trouble swallowing and voice change.    Eyes:  Negative for photophobia and visual disturbance.   Respiratory:  Positive for cough. Negative for shortness of breath.    Cardiovascular:  Negative for chest pain and leg swelling.   Gastrointestinal:  Positive for nausea. Negative for blood in stool.   Endocrine: Negative for polydipsia and polyuria.   Genitourinary:  Negative for difficulty urinating and hematuria.   Musculoskeletal:  Positive for back pain and myalgias. Negative for neck pain and neck stiffness.   Skin:  Negative for pallor and rash.   Allergic/Immunologic: Negative for food allergies and immunocompromised state.   Neurological:  Negative for seizures and syncope.   Psychiatric/Behavioral:  Negative for  agitation, behavioral problems and confusion.    Objective:     Vital Signs (Most Recent):  Temp: (S) 99.9 °F (37.7 °C) (02/13/23 1436)  Pulse: 96 (02/13/23 1517)  Resp: (!) 22 (02/13/23 1517)  BP: (!) 106/57 (02/13/23 1517)  SpO2: 100 % (02/13/23 1517)   Vital Signs (24h Range):  Temp:  [99.9 °F (37.7 °C)-103.2 °F (39.6 °C)] 99.9 °F (37.7 °C)  Pulse:  [] 96  Resp:  [16-22] 22  SpO2:  [99 %-100 %] 100 %  BP: (106-122)/(57-71) 106/57     Weight: 73.5 kg (162 lb)  Body mass index is 23.24 kg/m².    Physical Exam  Vitals and nursing note reviewed.   Constitutional:       General: She is not in acute distress.     Appearance: She is well-developed and normal weight. She is ill-appearing. She is not diaphoretic.   HENT:      Head: Normocephalic and atraumatic.      Nose: Congestion and rhinorrhea present.      Mouth/Throat:      Mouth: Mucous membranes are moist.      Pharynx: No oropharyngeal exudate.   Eyes:      General: No scleral icterus.     Pupils: Pupils are equal, round, and reactive to light.   Neck:      Thyroid: No thyromegaly.   Cardiovascular:      Rate and Rhythm: Normal rate and regular rhythm.      Heart sounds: No murmur heard.  Pulmonary:      Effort: No respiratory distress.      Breath sounds: No stridor. No wheezing or rales.   Abdominal:      General: There is no distension.      Palpations: Abdomen is soft. There is no mass.      Tenderness: There is no guarding.   Musculoskeletal:         General: No swelling or deformity. Normal range of motion.      Cervical back: Normal range of motion and neck supple. No rigidity.      Right lower leg: No edema.      Left lower leg: No edema.   Skin:     General: Skin is warm and dry.      Capillary Refill: Capillary refill takes less than 2 seconds.      Coloration: Skin is not jaundiced.   Neurological:      General: No focal deficit present.      Mental Status: She is alert and oriented to person, place, and time.      Cranial Nerves: No cranial  nerve deficit.   Psychiatric:         Mood and Affect: Mood normal.         Behavior: Behavior normal.         CRANIAL NERVES     CN III, IV, VI   Pupils are equal, round, and reactive to light.       Recent Results (from the past 24 hour(s))   POCT COVID-19 Rapid Screening    Collection Time: 02/13/23 12:25 PM   Result Value Ref Range    POC Rapid COVID Negative Negative     Acceptable Yes    POCT Influenza A/B Molecular    Collection Time: 02/13/23 12:26 PM   Result Value Ref Range    POC Molecular Influenza A Ag Negative Negative, Not Reported    POC Molecular Influenza B Ag Negative Negative, Not Reported     Acceptable Yes    CBC auto differential    Collection Time: 02/13/23 12:59 PM   Result Value Ref Range    WBC 25.61 (H) 3.90 - 12.70 K/uL    RBC 3.93 (L) 4.00 - 5.40 M/uL    Hemoglobin 6.5 (L) 12.0 - 16.0 g/dL    Hematocrit 25.0 (L) 37.0 - 48.5 %    MCV 64 (L) 82 - 98 fL    MCH 16.5 (L) 27.0 - 31.0 pg    MCHC 26.0 (L) 32.0 - 36.0 g/dL    RDW 21.7 (H) 11.5 - 14.5 %    Platelets 295 150 - 450 K/uL    MPV 9.7 9.2 - 12.9 fL    Immature Granulocytes Test Not Performed 0.0 - 0.5 %    Immature Grans (Abs) Test Not Performed 0.00 - 0.04 K/uL    nRBC 0 0 /100 WBC    Gran % 88.0 (H) 38.0 - 73.0 %    Lymph % 6.0 (L) 18.0 - 48.0 %    Mono % 0.0 (L) 4.0 - 15.0 %    Eosinophil % 0.0 0.0 - 8.0 %    Basophil % 0.0 0.0 - 1.9 %    Bands 4.0 %    Metamyelocytes 2.0 %    Platelet Estimate Appears normal     Aniso Slight     Poik Slight     Poly Occasional     Hypo Moderate     Ovalocytes Occasional     Differential Method Manual    Comprehensive metabolic panel    Collection Time: 02/13/23 12:59 PM   Result Value Ref Range    Sodium 136 136 - 145 mmol/L    Potassium 3.8 3.5 - 5.1 mmol/L    Chloride 103 95 - 110 mmol/L    CO2 16 (L) 23 - 29 mmol/L    Glucose 90 70 - 110 mg/dL    BUN 15 6 - 20 mg/dL    Creatinine 1.4 0.5 - 1.4 mg/dL    Calcium 9.3 8.7 - 10.5 mg/dL    Total Protein 9.2 (H) 6.0 - 8.4  g/dL    Albumin 3.8 3.5 - 5.2 g/dL    Total Bilirubin 0.8 0.1 - 1.0 mg/dL    Alkaline Phosphatase 71 55 - 135 U/L    AST 33 10 - 40 U/L    ALT 22 10 - 44 U/L    Anion Gap 17 (H) 8 - 16 mmol/L    eGFR 48 (A) >60 mL/min/1.73 m^2   POCT urine pregnancy    Collection Time: 02/13/23 12:59 PM   Result Value Ref Range    POC Preg Test, Ur Negative Negative     Acceptable Yes    Direct antiglobulin test    Collection Time: 02/13/23  2:18 PM   Result Value Ref Range    Direct Yesica (PARKER) NEG    Iron and TIBC    Collection Time: 02/13/23  2:18 PM   Result Value Ref Range    Iron <10 (L) 30 - 160 ug/dL    Transferrin 310 200 - 375 mg/dL    TIBC 459 (H) 250 - 450 ug/dL    Saturated Iron Unable to calculate 20 - 50 %   Lactate dehydrogenase    Collection Time: 02/13/23  2:18 PM   Result Value Ref Range     110 - 260 U/L   CK    Collection Time: 02/13/23  2:18 PM   Result Value Ref Range    CPK 67 20 - 180 U/L   Prepare RBC 1 Unit    Collection Time: 02/13/23  2:18 PM   Result Value Ref Range    UNIT NUMBER [id number]     Product Code B0075S46     DISPENSE STATUS CROSSMATCHED     CODING SYSTEM VQGT902     Unit Blood Type Code 6200     Unit Blood Type A POS     Unit Expiration [id number]     CROSSMATCH INTERPRETATION Compatible    Type & Screen    Collection Time: 02/13/23  2:18 PM   Result Value Ref Range    Group & Rh A POS     Indirect Yesica NEG    Procalcitonin    Collection Time: 02/13/23  2:18 PM   Result Value Ref Range    Procalcitonin 6.51 (H) <0.25 ng/mL   A1 Typing    Collection Time: 02/13/23  2:18 PM   Result Value Ref Range    A1 Typing POS    Lactic acid, plasma #1    Collection Time: 02/13/23  2:20 PM   Result Value Ref Range    Lactate (Lactic Acid) 1.1 0.5 - 2.2 mmol/L   Reticulocytes    Collection Time: 02/13/23  2:20 PM   Result Value Ref Range    Retic 1.0 0.5 - 2.5 %   ISTAT Lactate    Collection Time: 02/13/23  2:36 PM   Result Value Ref Range    POC Lactate 0.61 0.5 - 2.2 mmol/L     Sample VENOUS     Site Other     Allens Test N/A        Microbiology Results (last 7 days)       Procedure Component Value Units Date/Time    Blood culture x two cultures. Draw prior to antibiotics. [[id number]] Collected: 02/13/23 1421    Order Status: Sent Specimen: Blood from Peripheral, Hand, Left Updated: 02/13/23 1434    Blood culture x two cultures. Draw prior to antibiotics. [[id number]] Collected: 02/13/23 1420    Order Status: Sent Specimen: Blood from Peripheral, Wrist, Right Updated: 02/13/23 1434    Culture, Respiratory with Gram Stain [[id number]]     Order Status: No result Specimen: Respiratory     Respiratory Infection Panel (PCR), Nasopharyngeal [[id number]]     Order Status: No result Specimen: Nasopharyngeal Swab              Imaging Results              CTA Chest Non-Coronary (PE Studies) (Final result)  Result time 02/13/23 15:58:15      Final result by Everton Hamilton III, MD (02/13/23 15:58:15)                   Impression:      No evidence of pulmonary embolus or acute aortic syndrome.    No acute process seen.      Electronically signed by: Everton Hamilton MD  Date:    02/13/2023  Time:    15:58               Narrative:    EXAMINATION:  CTA CHEST NON CORONARY (PE STUDIES)    CLINICAL HISTORY:  Pulmonary embolism (PE) suspected, unknown D-dimer;    FINDINGS:  Patient was administered 75 cc of Omnipaque 350 intravenously.    The arch and great vessels are unremarkable.  Pulmonary arteries are well opacified and there is no evidence of pulmonary embolus.  No mediastinal, hilar, or axillary adenopathy is seen.  The trachea and bronchi are patent.  No emphysema, bronchiectasis, interstitial lung disease or airway trapping seen.  No pulmonary nodules, masses, or infiltrates are seen.  Bones are unremarkable.                                       X-Ray Chest PA And Lateral (Final result)  Result time 02/13/23 13:09:07      Final result by Dillon Ritter MD (02/13/23 13:09:07)                    Impression:      See above      Electronically signed by: Dillon Ritter MD  Date:    02/13/2023  Time:    13:09               Narrative:    EXAMINATION:  XR CHEST PA AND LATERAL    CLINICAL HISTORY:  Cough, unspecified    TECHNIQUE:  PA and lateral views of the chest were performed.    COMPARISON:  None    FINDINGS:  Heart size normal.  The lungs are clear.  No pleural effusion                                          Assessment/Plan:     * Severe sepsis  This patient does not have evidence of infective focus  My overall impression is sepsis.  Source: Unknown  Antibiotics given-   Antibiotics (72h ago, onward)    Start     Stop Route Frequency Ordered    02/14/23 1500  vancomycin 1,250 mg in dextrose 5 % (D5W) 250 mL IVPB (Vial-Mate)         -- IV Every 24 hours (non-standard times) 02/13/23 1555    02/13/23 1530  cefepime in dextrose 5 % IVPB 2 g         -- IV Every 8 hours (non-standard times) 02/13/23 1424    02/13/23 1523  vancomycin - pharmacy to dose  (vancomycin IVPB)        See Hyperspace for full Linked Orders Report.    -- IV pharmacy to manage frequency 02/13/23 1424        Latest lactate reviewed-  Recent Labs   Lab 02/13/23  1420   LACTATE 1.1       Will Not start Pressors- Levophed for MAP of 65  Source control achieved by: ABx & fluid  · Patient has 4/4 sirs:  103.2 F , RR 22, WBC 26 K  · Vanc and cefepime initiated  · Sepsis fluids given  · Blood cultures pending  · UA pending  · CTA unremarkable  · Source unclear this point    Menorrhagia  · Hemoglobin 6.5   · Iron undetectable  · 1 unit of blood given  · Will give 2 doses of IV iron as well  · Follow-up OBGYN, patient would benefit from birth control that would suppress her menses who began having children could consider partial hysterectomy.      Iron deficiency anemia  · See above      Essential hypertension  · Hold antihypertensives in the setting of bleeding and infection        VTE Risk Mitigation (From admission, onward)         Ordered      IP VTE LOW RISK PATIENT  Once         02/13/23 1440     Place sequential compression device  Until discontinued         02/13/23 1440                   Mitch Church MD  Department of Hospital Medicine   [facility] [place]

## 2023-02-14 LAB
ALBUMIN SERPL BCP-MCNC: 2.9 G/DL (ref 3.5–5.2)
ALP SERPL-CCNC: 77 U/L (ref 55–135)
ALT SERPL W/O P-5'-P-CCNC: 13 U/L (ref 10–44)
ANION GAP SERPL CALC-SCNC: 10 MMOL/L (ref 8–16)
AST SERPL-CCNC: 23 U/L (ref 10–40)
BASOPHILS # BLD AUTO: 0.02 K/UL (ref 0–0.2)
BASOPHILS # BLD AUTO: 0.04 K/UL (ref 0–0.2)
BASOPHILS NFR BLD: 0.1 % (ref 0–1.9)
BASOPHILS NFR BLD: 0.2 % (ref 0–1.9)
BILIRUB SERPL-MCNC: 1.4 MG/DL (ref 0.1–1)
BLD PROD TYP BPU: NORMAL
BLD PROD TYP BPU: NORMAL
BLOOD UNIT EXPIRATION DATE: NORMAL
BLOOD UNIT EXPIRATION DATE: NORMAL
BLOOD UNIT TYPE CODE: 6200
BLOOD UNIT TYPE CODE: 6200
BLOOD UNIT TYPE: NORMAL
BLOOD UNIT TYPE: NORMAL
BUN SERPL-MCNC: 15 MG/DL (ref 6–20)
CALCIUM SERPL-MCNC: 8.4 MG/DL (ref 8.7–10.5)
CHLORIDE SERPL-SCNC: 109 MMOL/L (ref 95–110)
CO2 SERPL-SCNC: 19 MMOL/L (ref 23–29)
CODING SYSTEM: NORMAL
CODING SYSTEM: NORMAL
CREAT SERPL-MCNC: 1 MG/DL (ref 0.5–1.4)
CROSSMATCH INTERPRETATION: NORMAL
CROSSMATCH INTERPRETATION: NORMAL
DIFFERENTIAL METHOD: ABNORMAL
DIFFERENTIAL METHOD: ABNORMAL
DISPENSE STATUS: NORMAL
DISPENSE STATUS: NORMAL
EOSINOPHIL # BLD AUTO: 0 K/UL (ref 0–0.5)
EOSINOPHIL # BLD AUTO: 0 K/UL (ref 0–0.5)
EOSINOPHIL NFR BLD: 0.1 % (ref 0–8)
EOSINOPHIL NFR BLD: 0.2 % (ref 0–8)
ERYTHROCYTE [DISTWIDTH] IN BLOOD BY AUTOMATED COUNT: 24.3 % (ref 11.5–14.5)
ERYTHROCYTE [DISTWIDTH] IN BLOOD BY AUTOMATED COUNT: 25.8 % (ref 11.5–14.5)
EST. GFR  (NO RACE VARIABLE): >60 ML/MIN/1.73 M^2
FOLATE SERPL-MCNC: 12.6 NG/ML (ref 4–24)
GIANT PLATELETS BLD QL SMEAR: PRESENT
GLUCOSE SERPL-MCNC: 105 MG/DL (ref 70–110)
HCT VFR BLD AUTO: 21.6 % (ref 37–48.5)
HCT VFR BLD AUTO: 24.1 % (ref 37–48.5)
HGB BLD-MCNC: 6 G/DL (ref 12–16)
HGB BLD-MCNC: 7 G/DL (ref 12–16)
IMM GRANULOCYTES # BLD AUTO: 0.07 K/UL (ref 0–0.04)
IMM GRANULOCYTES # BLD AUTO: 0.08 K/UL (ref 0–0.04)
IMM GRANULOCYTES NFR BLD AUTO: 0.4 % (ref 0–0.5)
IMM GRANULOCYTES NFR BLD AUTO: 0.5 % (ref 0–0.5)
LYMPHOCYTES # BLD AUTO: 1.3 K/UL (ref 1–4.8)
LYMPHOCYTES # BLD AUTO: 1.5 K/UL (ref 1–4.8)
LYMPHOCYTES NFR BLD: 7.8 % (ref 18–48)
LYMPHOCYTES NFR BLD: 9.9 % (ref 18–48)
MAGNESIUM SERPL-MCNC: 2.2 MG/DL (ref 1.6–2.6)
MCH RBC QN AUTO: 18.5 PG (ref 27–31)
MCH RBC QN AUTO: 19.9 PG (ref 27–31)
MCHC RBC AUTO-ENTMCNC: 27.8 G/DL (ref 32–36)
MCHC RBC AUTO-ENTMCNC: 29 G/DL (ref 32–36)
MCV RBC AUTO: 67 FL (ref 82–98)
MCV RBC AUTO: 69 FL (ref 82–98)
MONOCYTES # BLD AUTO: 1.5 K/UL (ref 0.3–1)
MONOCYTES # BLD AUTO: 1.5 K/UL (ref 0.3–1)
MONOCYTES NFR BLD: 9.4 % (ref 4–15)
MONOCYTES NFR BLD: 9.9 % (ref 4–15)
NEUTROPHILS # BLD AUTO: 11.8 K/UL (ref 1.8–7.7)
NEUTROPHILS # BLD AUTO: 13.3 K/UL (ref 1.8–7.7)
NEUTROPHILS NFR BLD: 79.5 % (ref 38–73)
NEUTROPHILS NFR BLD: 82 % (ref 38–73)
NRBC BLD-RTO: 0 /100 WBC
NRBC BLD-RTO: 0 /100 WBC
NUM UNITS TRANS PACKED RBC: NORMAL
NUM UNITS TRANS PACKED RBC: NORMAL
PHOSPHATE SERPL-MCNC: 2.5 MG/DL (ref 2.7–4.5)
PLATELET # BLD AUTO: 220 K/UL (ref 150–450)
PLATELET # BLD AUTO: 223 K/UL (ref 150–450)
PLATELET BLD QL SMEAR: ABNORMAL
PMV BLD AUTO: 10.5 FL (ref 9.2–12.9)
PMV BLD AUTO: ABNORMAL FL (ref 9.2–12.9)
POTASSIUM SERPL-SCNC: 3.5 MMOL/L (ref 3.5–5.1)
PROT SERPL-MCNC: 7 G/DL (ref 6–8.4)
RBC # BLD AUTO: 3.24 M/UL (ref 4–5.4)
RBC # BLD AUTO: 3.52 M/UL (ref 4–5.4)
SODIUM SERPL-SCNC: 138 MMOL/L (ref 136–145)
VIT B12 SERPL-MCNC: 1132 PG/ML (ref 210–950)
WBC # BLD AUTO: 14.79 K/UL (ref 3.9–12.7)
WBC # BLD AUTO: 16.24 K/UL (ref 3.9–12.7)

## 2023-02-14 PROCEDURE — 25000003 PHARM REV CODE 250: Performed by: STUDENT IN AN ORGANIZED HEALTH CARE EDUCATION/TRAINING PROGRAM

## 2023-02-14 PROCEDURE — 83735 ASSAY OF MAGNESIUM: CPT | Performed by: STUDENT IN AN ORGANIZED HEALTH CARE EDUCATION/TRAINING PROGRAM

## 2023-02-14 PROCEDURE — P9016 RBC LEUKOCYTES REDUCED: HCPCS | Performed by: STUDENT IN AN ORGANIZED HEALTH CARE EDUCATION/TRAINING PROGRAM

## 2023-02-14 PROCEDURE — 36415 COLL VENOUS BLD VENIPUNCTURE: CPT | Performed by: STUDENT IN AN ORGANIZED HEALTH CARE EDUCATION/TRAINING PROGRAM

## 2023-02-14 PROCEDURE — 84100 ASSAY OF PHOSPHORUS: CPT | Performed by: STUDENT IN AN ORGANIZED HEALTH CARE EDUCATION/TRAINING PROGRAM

## 2023-02-14 PROCEDURE — 80053 COMPREHEN METABOLIC PANEL: CPT | Performed by: STUDENT IN AN ORGANIZED HEALTH CARE EDUCATION/TRAINING PROGRAM

## 2023-02-14 PROCEDURE — 63600175 PHARM REV CODE 636 W HCPCS: Mod: TB,JG | Performed by: STUDENT IN AN ORGANIZED HEALTH CARE EDUCATION/TRAINING PROGRAM

## 2023-02-14 PROCEDURE — 85025 COMPLETE CBC W/AUTO DIFF WBC: CPT | Performed by: STUDENT IN AN ORGANIZED HEALTH CARE EDUCATION/TRAINING PROGRAM

## 2023-02-14 PROCEDURE — 21400001 HC TELEMETRY ROOM

## 2023-02-14 PROCEDURE — 36430 TRANSFUSION BLD/BLD COMPNT: CPT

## 2023-02-14 PROCEDURE — 85025 COMPLETE CBC W/AUTO DIFF WBC: CPT | Mod: 91 | Performed by: STUDENT IN AN ORGANIZED HEALTH CARE EDUCATION/TRAINING PROGRAM

## 2023-02-14 PROCEDURE — P9016 RBC LEUKOCYTES REDUCED: HCPCS | Performed by: PHYSICIAN ASSISTANT

## 2023-02-14 RX ORDER — HYDROCODONE BITARTRATE AND ACETAMINOPHEN 500; 5 MG/1; MG/1
TABLET ORAL
Status: DISCONTINUED | OUTPATIENT
Start: 2023-02-14 | End: 2023-02-16

## 2023-02-14 RX ORDER — HYDROCODONE BITARTRATE AND ACETAMINOPHEN 500; 5 MG/1; MG/1
TABLET ORAL
Status: DISCONTINUED | OUTPATIENT
Start: 2023-02-14 | End: 2023-02-16 | Stop reason: HOSPADM

## 2023-02-14 RX ADMIN — SODIUM CHLORIDE 125 MG: 9 INJECTION, SOLUTION INTRAVENOUS at 02:02

## 2023-02-14 RX ADMIN — ACETAMINOPHEN 650 MG: 325 TABLET ORAL at 09:02

## 2023-02-14 RX ADMIN — VANCOMYCIN HYDROCHLORIDE 1250 MG: 1.25 INJECTION, POWDER, LYOPHILIZED, FOR SOLUTION INTRAVENOUS at 05:02

## 2023-02-14 RX ADMIN — CEFEPIME HYDROCHLORIDE 2 G: 2 INJECTION, SOLUTION INTRAVENOUS at 03:02

## 2023-02-14 RX ADMIN — ACETAMINOPHEN 650 MG: 325 TABLET ORAL at 02:02

## 2023-02-14 RX ADMIN — CEFEPIME HYDROCHLORIDE 2 G: 2 INJECTION, SOLUTION INTRAVENOUS at 09:02

## 2023-02-14 RX ADMIN — CEFEPIME HYDROCHLORIDE 2 G: 2 INJECTION, SOLUTION INTRAVENOUS at 02:02

## 2023-02-14 RX ADMIN — SODIUM CHLORIDE 125 MG: 9 INJECTION, SOLUTION INTRAVENOUS at 01:02

## 2023-02-14 RX ADMIN — SODIUM CHLORIDE: 9 INJECTION, SOLUTION INTRAVENOUS at 03:02

## 2023-02-14 NOTE — ASSESSMENT & PLAN NOTE
Presents with Hb 6.5, then 5 --> giving 2 units pRBC and IV iron infusion  - suspect from heavy menstrual cycles  - check CBC post transfusion

## 2023-02-14 NOTE — NURSING
Second nurse at bedside for blood administration verification.  Consent verified, blood and patient armband verified.  Patient educated on the potential side effects and adverse reactions.  VSS.  This nurse remains at bedside for administration and monitoring per policy.

## 2023-02-14 NOTE — PROGRESS NOTES
[facility] Medicine  Progress Note    Patient Name: Gina Waller  MRN: [id number]  Patient Class: IP- Inpatient   Admission Date: 2/13/2023  Length of Stay: 1 days  Attending Physician: Paco Mora MD  Primary Care Provider: Bigg Valdes MD        Subjective:     Principal Problem:Severe sepsis        HPI:    Gina Waller is a 42 y.o. female who has a past medical history of Hypertension, menorrhagia, iron deficiency anemia come presented to the ED with CC of Fever. She complains of Flu like Symptoms- Fever, chills, myalgias, back pain, cold sweats, cough, rhinorrhea. She denies CP, AP, or SOB.  Patient has been taking Tylenol for fever, which helps temporarily.  Her friend is sick with an upper respiratory infection, with unknown microorganism.  Patient is COVID and flu negative in the emergency room.  She has 103 fever while in the ED. Urine pregnancy test is negative, patient states that she is on her menstrual cycle now and she has heavy bleeding.  Hemoglobin 6.5 on admission.  1 unit of blood ordered, and anemia labs ordered.  WBC count 26 K, patient started on broad-spectrum antibiotics, unknown source at this time.  Denies urinary symptoms.  Denies green sputum.  CTA performed in the ED, no signs of PE or infection.  Patient has 4/4 sirs: 103.2 F, , RR 22, WBC 26 K      Overview/Hospital Course:  Admitted with SIRS and unclear source of infection, fever up to 103 and microcytic anemia consistent with iron deficiency. Started on broad spectrum antibiotics for possible infection. Transfused 2 units pRBC and iron infusion.       Interval History: feeling better today, has had no infectious symptoms prior to admission other than feeling like she had a fever and chills. No rash, congestion, shortness of breath.     Review of Systems  Objective:     Vital Signs (Most Recent):  Temp: 99.9 °F (37.7 °C) (02/14/23 1113)  Pulse: 89 (02/14/23 1138)  Resp: 20 (02/14/23 1138)  BP: (!)  140/68 (02/14/23 1138)  SpO2: 100 % (02/14/23 1138)   Vital Signs (24h Range):  Temp:  [96.8 °F (36 °C)-103.2 °F (39.6 °C)] 99.9 °F (37.7 °C)  Pulse:  [] 89  Resp:  [18-22] 20  SpO2:  [98 %-100 %] 100 %  BP: (104-140)/(53-78) 140/68     Weight: 74 kg (163 lb 2.3 oz)  Body mass index is 23.41 kg/m².    Intake/Output Summary (Last 24 hours) at 2/14/2023 1324  Last data filed at 2/14/2023 0800  Gross per 24 hour   Intake 1836 ml   Output --   Net 1836 ml      Physical Exam  Vitals and nursing note reviewed.   Constitutional:       General: She is not in acute distress.     Appearance: She is not ill-appearing.   HENT:      Head: Normocephalic.   Cardiovascular:      Rate and Rhythm: Normal rate and regular rhythm.      Pulses: Normal pulses.      Heart sounds: No murmur heard.  Pulmonary:      Effort: Pulmonary effort is normal. No respiratory distress.      Breath sounds: No wheezing.   Abdominal:      General: Bowel sounds are normal. There is no distension.      Tenderness: There is no abdominal tenderness.   Skin:     General: Skin is warm and dry.   Neurological:      General: No focal deficit present.      Mental Status: She is alert and oriented to person, place, and time.   Psychiatric:         Mood and Affect: Mood normal.         Thought Content: Thought content normal.       Significant Labs: All pertinent labs within the past 24 hours have been reviewed.    Significant Imaging: I have reviewed all pertinent imaging results/findings within the past 24 hours.      Assessment/Plan:      * Severe sepsis  This patient does not have evidence of infective focus  My overall impression is sepsis but still unclear etiology. WBC 26,000, tachycardic to 126 and febrile to 103. COVID negative and flu negative. Urinalysis with 46 WBCs but asymptomatic. CT chest with no acute findings. She has an elevated procalcitonin of 6.51.   Source: Unknown  Antibiotics given-   Antibiotics (72h ago, onward)    Start     Stop  Route Frequency Ordered    02/14/23 1500  vancomycin 1,250 mg in dextrose 5 % (D5W) 250 mL IVPB (Vial-Mate)         -- IV Every 24 hours (non-standard times) 02/13/23 1555    02/13/23 1530  cefepime in dextrose 5 % IVPB 2 g         -- IV Every 8 hours (non-standard times) 02/13/23 1424    02/13/23 1523  vancomycin - pharmacy to dose  (vancomycin IVPB)        See Hyperspace for full Linked Orders Report.    -- IV pharmacy to manage frequency 02/13/23 1424        Latest lactate reviewed-  Recent Labs   Lab 02/13/23  1420 02/13/23 2007   LACTATE 1.1 1.2       Will Not start Pressors- Levophed for MAP of 65  Source control achieved by: ABx & fluid  Patient has 4/4 sirs:  103.2 F , RR 22, WBC 26 K  Vanc and cefepime initiated  Sepsis fluids given  Blood cultures pending - negative to date  UA with 46 WBC - pending cultures  CTA unremarkable  Source unclear at this point    - Fever curve trending down, WBC improving.     Iron deficiency anemia  Presents with Hb 6.5, then 5 --> giving 2 units pRBC and IV iron infusion  - suspect from heavy menstrual cycles  - check CBC post transfusion      Menorrhagia  - Hemoglobin 6.5 and decreased to 5 after fluids given  - Iron undetectable  - 2 units of pRBC given so far. Also 2 dose of IV iron.  - patient has history of heavy menses since tubes were tied.  - will need to f/u with OB/GYN        Essential hypertension  · Hold antihypertensives in the setting of bleeding and infection        VTE Risk Mitigation (From admission, onward)         Ordered     IP VTE LOW RISK PATIENT  Once         02/13/23 1440     Place sequential compression device  Until discontinued         02/13/23 1440                Discharge Planning   DANILO: 2/16/2023     Code Status: Full Code   Is the patient medically ready for discharge?:     Reason for patient still in hospital (select all that apply): Treatment  Discharge Plan A: Home with family                  Paco Mora MD  Department of Hospital  Medicine   [facility]

## 2023-02-14 NOTE — HOSPITAL COURSE
Admitted with SIRS and unclear source of infection, fever up to 103 and microcytic anemia consistent with iron deficiency. Started on broad spectrum antibiotics for possible infection. Transfused 2 units pRBC and iron infusion.    No bruits; no thyromegaly or nodules

## 2023-02-14 NOTE — NURSING
Lab reported critical H/H 5.0/19.0; informed JO Gonzalez NP; no new orders noted; pt to receive one unit of PRBCs when ready

## 2023-02-14 NOTE — PROGRESS NOTES
Arrived without signs of distress. No reports of chest pain, cough or weakness. Family present. Call bell in reach, bed low, SR up x2 .    Patient has a chronic catheter seen by Urology  Urine is positive for nitrates, bacteria and white cells  Started on ceftriaxone 1 g Q 24 hr for 5 days  Last urine culture was positive for Klebsiella oxalate  Possible reason for fall is UTI

## 2023-02-14 NOTE — ASSESSMENT & PLAN NOTE
This patient does not have evidence of infective focus  My overall impression is sepsis but still unclear etiology. WBC 26,000, tachycardic to 126 and febrile to 103. COVID negative and flu negative. Urinalysis with 46 WBCs but asymptomatic. CT chest with no acute findings. She has an elevated procalcitonin of 6.51.   Source: Unknown  Antibiotics given-   Antibiotics (72h ago, onward)    Start     Stop Route Frequency Ordered    02/14/23 1500  vancomycin 1,250 mg in dextrose 5 % (D5W) 250 mL IVPB (Vial-Mate)         -- IV Every 24 hours (non-standard times) 02/13/23 1555    02/13/23 1530  cefepime in dextrose 5 % IVPB 2 g         -- IV Every 8 hours (non-standard times) 02/13/23 1424    02/13/23 1523  vancomycin - pharmacy to dose  (vancomycin IVPB)        See Hyperspace for full Linked Orders Report.    -- IV pharmacy to manage frequency 02/13/23 1424        Latest lactate reviewed-  Recent Labs   Lab 02/13/23  1420 02/13/23 2007   LACTATE 1.1 1.2       Will Not start Pressors- Levophed for MAP of 65  Source control achieved by: ABx & fluid  Patient has 4/4 sirs:  103.2 F , RR 22, WBC 26 K  Vanc and cefepime initiated  Sepsis fluids given  Blood cultures pending - negative to date  UA with 46 WBC - pending cultures  CTA unremarkable  Source unclear at this point    - Fever curve trending down, WBC improving.

## 2023-02-14 NOTE — ASSESSMENT & PLAN NOTE
- Hemoglobin 6.5 and decreased to 5 after fluids given  - Iron undetectable  - 2 units of pRBC given so far. Also 2 dose of IV iron.  - patient has history of heavy menses since tubes were tied.  - will need to f/u with OB/GYN

## 2023-02-14 NOTE — PROGRESS NOTES
Vancomycin consult follow-up:    Patient reviewed, renal function stable, no new levels, continue current therapy; Next levels due: trough due 2/15/2023 at 1400

## 2023-02-14 NOTE — SUBJECTIVE & OBJECTIVE
Interval History: feeling better today, has had no infectious symptoms prior to admission other than feeling like she had a fever and chills. No rash, congestion, shortness of breath.     Review of Systems  Objective:     Vital Signs (Most Recent):  Temp: 99.9 °F (37.7 °C) (02/14/23 1113)  Pulse: 89 (02/14/23 1138)  Resp: 20 (02/14/23 1138)  BP: (!) 140/68 (02/14/23 1138)  SpO2: 100 % (02/14/23 1138)   Vital Signs (24h Range):  Temp:  [96.8 °F (36 °C)-103.2 °F (39.6 °C)] 99.9 °F (37.7 °C)  Pulse:  [] 89  Resp:  [18-22] 20  SpO2:  [98 %-100 %] 100 %  BP: (104-140)/(53-78) 140/68     Weight: 74 kg (163 lb 2.3 oz)  Body mass index is 23.41 kg/m².    Intake/Output Summary (Last 24 hours) at 2/14/2023 1324  Last data filed at 2/14/2023 0800  Gross per 24 hour   Intake 1836 ml   Output --   Net 1836 ml      Physical Exam  Vitals and nursing note reviewed.   Constitutional:       General: She is not in acute distress.     Appearance: She is not ill-appearing.   HENT:      Head: Normocephalic.   Cardiovascular:      Rate and Rhythm: Normal rate and regular rhythm.      Pulses: Normal pulses.      Heart sounds: No murmur heard.  Pulmonary:      Effort: Pulmonary effort is normal. No respiratory distress.      Breath sounds: No wheezing.   Abdominal:      General: Bowel sounds are normal. There is no distension.      Tenderness: There is no abdominal tenderness.   Skin:     General: Skin is warm and dry.   Neurological:      General: No focal deficit present.      Mental Status: She is alert and oriented to person, place, and time.   Psychiatric:         Mood and Affect: Mood normal.         Thought Content: Thought content normal.       Significant Labs: All pertinent labs within the past 24 hours have been reviewed.    Significant Imaging: I have reviewed all pertinent imaging results/findings within the past 24 hours.

## 2023-02-14 NOTE — NURSING
Transfusion of 1UPRBC complete.  Patient tolerated well.  No signs and or symptoms of reaction noted.  Timed CBC noted for 1700.

## 2023-02-15 PROBLEM — R65.10 SIRS (SYSTEMIC INFLAMMATORY RESPONSE SYNDROME): Status: ACTIVE | Noted: 2023-02-13

## 2023-02-15 LAB
ALBUMIN SERPL BCP-MCNC: 2.8 G/DL (ref 3.5–5.2)
ALP SERPL-CCNC: 76 U/L (ref 55–135)
ALT SERPL W/O P-5'-P-CCNC: 36 U/L (ref 10–44)
ANION GAP SERPL CALC-SCNC: 8 MMOL/L (ref 8–16)
AST SERPL-CCNC: 53 U/L (ref 10–40)
BACTERIA #/AREA URNS HPF: NORMAL /HPF
BACTERIA UR CULT: NORMAL
BASOPHILS # BLD AUTO: 0.03 K/UL (ref 0–0.2)
BASOPHILS NFR BLD: 0.3 % (ref 0–1.9)
BILIRUB SERPL-MCNC: 0.8 MG/DL (ref 0.1–1)
BILIRUB UR QL STRIP: NEGATIVE
BUN SERPL-MCNC: 9 MG/DL (ref 6–20)
CALCIUM SERPL-MCNC: 8.5 MG/DL (ref 8.7–10.5)
CHLORIDE SERPL-SCNC: 110 MMOL/L (ref 95–110)
CLARITY UR: CLEAR
CO2 SERPL-SCNC: 20 MMOL/L (ref 23–29)
COLOR UR: YELLOW
CREAT SERPL-MCNC: 0.8 MG/DL (ref 0.5–1.4)
D DIMER PPP IA.FEU-MCNC: 2.87 MG/L FEU
DIFFERENTIAL METHOD: ABNORMAL
EOSINOPHIL # BLD AUTO: 0 K/UL (ref 0–0.5)
EOSINOPHIL NFR BLD: 0.3 % (ref 0–8)
ERYTHROCYTE [DISTWIDTH] IN BLOOD BY AUTOMATED COUNT: 25.2 % (ref 11.5–14.5)
EST. GFR  (NO RACE VARIABLE): >60 ML/MIN/1.73 M^2
GLUCOSE SERPL-MCNC: 95 MG/DL (ref 70–110)
GLUCOSE UR QL STRIP: NEGATIVE
HAV IGM SERPL QL IA: NORMAL
HBV CORE IGM SERPL QL IA: NORMAL
HBV SURFACE AG SERPL QL IA: NORMAL
HCT VFR BLD AUTO: 25.5 % (ref 37–48.5)
HCV AB SERPL QL IA: NORMAL
HETEROPH AB SERPL QL IA: NEGATIVE
HGB BLD-MCNC: 7.5 G/DL (ref 12–16)
HGB UR QL STRIP: NEGATIVE
HIV1+2 IGG SERPL QL IA.RAPID: NORMAL
HYALINE CASTS #/AREA URNS LPF: 1 /LPF
IMM GRANULOCYTES # BLD AUTO: 0.08 K/UL (ref 0–0.04)
IMM GRANULOCYTES NFR BLD AUTO: 0.7 % (ref 0–0.5)
KETONES UR QL STRIP: NEGATIVE
LACTATE SERPL-SCNC: 1.9 MMOL/L (ref 0.5–2.2)
LEUKOCYTE ESTERASE UR QL STRIP: NEGATIVE
LYMPHOCYTES # BLD AUTO: 1.1 K/UL (ref 1–4.8)
LYMPHOCYTES NFR BLD: 10 % (ref 18–48)
MAGNESIUM SERPL-MCNC: 2.2 MG/DL (ref 1.6–2.6)
MCH RBC QN AUTO: 20.2 PG (ref 27–31)
MCHC RBC AUTO-ENTMCNC: 29.4 G/DL (ref 32–36)
MCV RBC AUTO: 69 FL (ref 82–98)
MICROSCOPIC COMMENT: NORMAL
MONOCYTES # BLD AUTO: 1.2 K/UL (ref 0.3–1)
MONOCYTES NFR BLD: 10.3 % (ref 4–15)
NEUTROPHILS # BLD AUTO: 9 K/UL (ref 1.8–7.7)
NEUTROPHILS NFR BLD: 78.4 % (ref 38–73)
NITRITE UR QL STRIP: NEGATIVE
NRBC BLD-RTO: 0 /100 WBC
PH UR STRIP: 6 [PH] (ref 5–8)
PHOSPHATE SERPL-MCNC: 2.6 MG/DL (ref 2.7–4.5)
PLATELET # BLD AUTO: 213 K/UL (ref 150–450)
PMV BLD AUTO: ABNORMAL FL (ref 9.2–12.9)
POTASSIUM SERPL-SCNC: 3.7 MMOL/L (ref 3.5–5.1)
PROT SERPL-MCNC: 7 G/DL (ref 6–8.4)
PROT UR QL STRIP: ABNORMAL
RBC # BLD AUTO: 3.71 M/UL (ref 4–5.4)
RBC #/AREA URNS HPF: 1 /HPF (ref 0–4)
SODIUM SERPL-SCNC: 138 MMOL/L (ref 136–145)
SP GR UR STRIP: 1.02 (ref 1–1.03)
SQUAMOUS #/AREA URNS HPF: 1 /HPF
URN SPEC COLLECT METH UR: ABNORMAL
UROBILINOGEN UR STRIP-ACNC: NEGATIVE EU/DL
VANCOMYCIN TROUGH SERPL-MCNC: 3.5 UG/ML (ref 10–22)
WBC # BLD AUTO: 11.44 K/UL (ref 3.9–12.7)
WBC #/AREA URNS HPF: 4 /HPF (ref 0–5)

## 2023-02-15 PROCEDURE — 63600175 PHARM REV CODE 636 W HCPCS: Performed by: HOSPITALIST

## 2023-02-15 PROCEDURE — 80307 DRUG TEST PRSMV CHEM ANLYZR: CPT | Performed by: HOSPITALIST

## 2023-02-15 PROCEDURE — 63600175 PHARM REV CODE 636 W HCPCS: Mod: TB,JG | Performed by: STUDENT IN AN ORGANIZED HEALTH CARE EDUCATION/TRAINING PROGRAM

## 2023-02-15 PROCEDURE — 85379 FIBRIN DEGRADATION QUANT: CPT | Performed by: HOSPITALIST

## 2023-02-15 PROCEDURE — 83735 ASSAY OF MAGNESIUM: CPT | Performed by: STUDENT IN AN ORGANIZED HEALTH CARE EDUCATION/TRAINING PROGRAM

## 2023-02-15 PROCEDURE — 25000003 PHARM REV CODE 250: Performed by: STUDENT IN AN ORGANIZED HEALTH CARE EDUCATION/TRAINING PROGRAM

## 2023-02-15 PROCEDURE — 36415 COLL VENOUS BLD VENIPUNCTURE: CPT | Performed by: HOSPITALIST

## 2023-02-15 PROCEDURE — 80202 ASSAY OF VANCOMYCIN: CPT | Performed by: STUDENT IN AN ORGANIZED HEALTH CARE EDUCATION/TRAINING PROGRAM

## 2023-02-15 PROCEDURE — 84100 ASSAY OF PHOSPHORUS: CPT | Performed by: STUDENT IN AN ORGANIZED HEALTH CARE EDUCATION/TRAINING PROGRAM

## 2023-02-15 PROCEDURE — 83605 ASSAY OF LACTIC ACID: CPT | Performed by: HOSPITALIST

## 2023-02-15 PROCEDURE — 80053 COMPREHEN METABOLIC PANEL: CPT | Performed by: STUDENT IN AN ORGANIZED HEALTH CARE EDUCATION/TRAINING PROGRAM

## 2023-02-15 PROCEDURE — 86703 HIV-1/HIV-2 1 RESULT ANTBDY: CPT | Performed by: HOSPITALIST

## 2023-02-15 PROCEDURE — 80074 ACUTE HEPATITIS PANEL: CPT | Performed by: HOSPITALIST

## 2023-02-15 PROCEDURE — 86308 HETEROPHILE ANTIBODY SCREEN: CPT | Performed by: HOSPITALIST

## 2023-02-15 PROCEDURE — 87040 BLOOD CULTURE FOR BACTERIA: CPT | Mod: 59 | Performed by: HOSPITALIST

## 2023-02-15 PROCEDURE — 81000 URINALYSIS NONAUTO W/SCOPE: CPT | Performed by: HOSPITALIST

## 2023-02-15 PROCEDURE — 36415 COLL VENOUS BLD VENIPUNCTURE: CPT | Performed by: STUDENT IN AN ORGANIZED HEALTH CARE EDUCATION/TRAINING PROGRAM

## 2023-02-15 PROCEDURE — 25000003 PHARM REV CODE 250: Performed by: HOSPITALIST

## 2023-02-15 PROCEDURE — 21400001 HC TELEMETRY ROOM

## 2023-02-15 PROCEDURE — 85025 COMPLETE CBC W/AUTO DIFF WBC: CPT | Performed by: STUDENT IN AN ORGANIZED HEALTH CARE EDUCATION/TRAINING PROGRAM

## 2023-02-15 RX ORDER — IBUPROFEN 400 MG/1
400 TABLET ORAL EVERY 6 HOURS PRN
Status: DISCONTINUED | OUTPATIENT
Start: 2023-02-15 | End: 2023-02-16 | Stop reason: HOSPADM

## 2023-02-15 RX ORDER — HYDRALAZINE HYDROCHLORIDE 20 MG/ML
15 INJECTION INTRAMUSCULAR; INTRAVENOUS EVERY 4 HOURS PRN
Status: DISCONTINUED | OUTPATIENT
Start: 2023-02-15 | End: 2023-02-16 | Stop reason: HOSPADM

## 2023-02-15 RX ORDER — AMLODIPINE BESYLATE 5 MG/1
5 TABLET ORAL DAILY
Status: DISCONTINUED | OUTPATIENT
Start: 2023-02-15 | End: 2023-02-16 | Stop reason: HOSPADM

## 2023-02-15 RX ORDER — LIDOCAINE 50 MG/G
1 PATCH TOPICAL
Status: DISCONTINUED | OUTPATIENT
Start: 2023-02-15 | End: 2023-02-16 | Stop reason: HOSPADM

## 2023-02-15 RX ORDER — LANOLIN ALCOHOL/MO/W.PET/CERES
1 CREAM (GRAM) TOPICAL DAILY
Status: DISCONTINUED | OUTPATIENT
Start: 2023-02-16 | End: 2023-02-16 | Stop reason: HOSPADM

## 2023-02-15 RX ADMIN — CEFEPIME HYDROCHLORIDE 2 G: 2 INJECTION, SOLUTION INTRAVENOUS at 09:02

## 2023-02-15 RX ADMIN — AMLODIPINE BESYLATE 5 MG: 5 TABLET ORAL at 05:02

## 2023-02-15 RX ADMIN — CEFEPIME HYDROCHLORIDE 2 G: 2 INJECTION, SOLUTION INTRAVENOUS at 06:02

## 2023-02-15 RX ADMIN — CEFEPIME HYDROCHLORIDE 2 G: 2 INJECTION, SOLUTION INTRAVENOUS at 01:02

## 2023-02-15 RX ADMIN — LIDOCAINE 1 PATCH: 50 PATCH TOPICAL at 04:02

## 2023-02-15 RX ADMIN — VANCOMYCIN HYDROCHLORIDE 1000 MG: 1 INJECTION, POWDER, LYOPHILIZED, FOR SOLUTION INTRAVENOUS at 04:02

## 2023-02-15 RX ADMIN — IBUPROFEN 400 MG: 400 TABLET, FILM COATED ORAL at 04:02

## 2023-02-15 RX ADMIN — ACETAMINOPHEN 650 MG: 325 TABLET ORAL at 08:02

## 2023-02-15 NOTE — NURSING
"Tylenol given for elevated temp. Patient provided urine sample cup, hygiene wipes, clean urine "hat"  and instructions for urinalysis.  Specimen cup at bedside for sputum sample. Xray at bedside  "

## 2023-02-15 NOTE — NURSING
Post transfusion hgb 7.0 and hematocrit 24.  Notified KENDRA Gonzalez.  New orders noted for 1UPRBC.

## 2023-02-15 NOTE — PROGRESS NOTES
[facility] Medicine  Progress Note    Patient Name: Gina Waller  MRN: [id number]  Patient Class: IP- Inpatient   Admission Date: 2/13/2023  Length of Stay: 2 days  Attending Physician: Dillon Enriquez MD  Primary Care Provider: Bigg Valdes MD        Subjective:     Principal Problem:SIRS (systemic inflammatory response syndrome)        HPI:    Gina Waller is a 42 y.o. female who has a past medical history of Hypertension, menorrhagia, iron deficiency anemia come presented to the ED with CC of Fever. She complains of Flu like Symptoms- Fever, chills, myalgias, back pain, cold sweats, cough, rhinorrhea. She denies CP, AP, or SOB.  Patient has been taking Tylenol for fever, which helps temporarily.  Her friend is sick with an upper respiratory infection, with unknown microorganism.  Patient is COVID and flu negative in the emergency room.  She has 103 fever while in the ED. Urine pregnancy test is negative, patient states that she is on her menstrual cycle now and she has heavy bleeding.  Hemoglobin 6.5 on admission.  1 unit of blood ordered, and anemia labs ordered.  WBC count 26 K, patient started on broad-spectrum antibiotics, unknown source at this time.  Denies urinary symptoms.  Denies green sputum.  CTA performed in the ED, no signs of PE or infection.  Patient has 4/4 sirs: 103.2 F, , RR 22, WBC 26 K      Overview/Hospital Course:  Admitted with SIRS and unclear source of infection, fever up to 103 and microcytic anemia consistent with iron deficiency. Started on broad spectrum antibiotics for possible infection. Transfused 2 units pRBC and iron infusion.       Interval History: No acute events overnight.  Noted fever again at 5AM this morning.  States she feels well.  Had episode of mild diffuse back pain this morning - resolved at time of my visit - but recurred in afternoon.  Denied dysuria, cough, neck stiffness, vaginal discharge, rash, leg swelling/pain, nasal/ear  congestion, diarrhea and unintentional weight loss.  Denies sick contact at home.  All questions answered and patient had no further complaints.     Objective:     Vital Signs (Most Recent):  Temp: 100.3 °F (37.9 °C) (02/15/23 1613)  Pulse: 80 (02/15/23 1613)  Resp: 18 (02/15/23 1613)  BP: (!) 170/93 (02/15/23 1613)  SpO2: 100 % (02/15/23 1613)   Vital Signs (24h Range):  Temp:  [98.8 °F (37.1 °C)-100.8 °F (38.2 °C)] 100.3 °F (37.9 °C)  Pulse:  [71-82] 80  Resp:  [18] 18  SpO2:  [98 %-100 %] 100 %  BP: (126-170)/(72-96) 170/93     Weight: 74 kg (163 lb 2.3 oz)  Body mass index is 23.41 kg/m².    Intake/Output Summary (Last 24 hours) at 2/15/2023 1626  Last data filed at 2/15/2023 1406  Gross per 24 hour   Intake 1500.33 ml   Output --   Net 1500.33 ml      Physical Exam  Vitals and nursing note reviewed.   Constitutional:       General: She is not in acute distress.     Appearance: She is not ill-appearing or toxic-appearing.   HENT:      Head: Normocephalic.      Nose: Nose normal.      Mouth/Throat:      Mouth: Mucous membranes are moist.   Eyes:      Extraocular Movements: Extraocular movements intact.      Conjunctiva/sclera: Conjunctivae normal.   Cardiovascular:      Rate and Rhythm: Normal rate and regular rhythm.      Pulses: Normal pulses.      Heart sounds: No murmur heard.  Pulmonary:      Effort: Pulmonary effort is normal. No respiratory distress.      Breath sounds: No wheezing.   Abdominal:      General: Bowel sounds are normal. There is no distension.      Tenderness: There is no abdominal tenderness.   Musculoskeletal:         General: No signs of injury. Normal range of motion.      Cervical back: Normal range of motion.      Right lower leg: No edema.      Left lower leg: No edema.      Comments: Could not illicit any back pain on exam - no point tenderness over spine and no CVA tenderness   Skin:     General: Skin is warm and dry.   Neurological:      General: No focal deficit present.       Mental Status: She is alert and oriented to person, place, and time.   Psychiatric:         Mood and Affect: Mood normal.         Thought Content: Thought content normal.       Significant Labs: All pertinent labs within the past 24 hours have been reviewed.    Significant Imaging: I have reviewed all pertinent imaging results/findings within the past 24 hours.      Assessment/Plan:      * SIRS (systemic inflammatory response syndrome)  -Admitted to inpatient status  -On admit noted fever and tachycardia with leukocytosis (26K), elevated procalcitonin (6.5) and normal lactic acid  -CXR on admit and repeat 2/15 without evidence of pneumonia  -CTA chest on admit without PE, dissection or evidence of pneumonia  -Flu and covid swabs negative.  HIV negative.  -Blood culture negative  -UA mildly suggestive of UTI, but culture negative and she had no symptoms of UTI  -No rashes to suggest cellulitis  -No source of bacterial infection thus far identified, but presentation strongly suggestive of one.  Overall feeling better but complains of intermittent back pain which she thinks is due to the hospital bed.  Leukocytosis nearly resolved but still with fevers this morning.  -Differential includes occult abdominal or spinal infection, viral infection, occult VTE (d-dimer is not negative), occult malignancy.  Ongoing fevers may be due to blood products, but initial SIRS vitals were prior to any transfusion.  -Will check CT abdomen/pelvis, hepatitis panel and monospot.  -Continue vancomycin and cefepime for now.  -If further fevers overnight will consider MRI T/L spine and possibly ID consultation.    Iron deficiency anemia  -Hb 6.5 on admit but dropped to 5.0.  -She is iron deficient and this is felt secondary to menorrhagia   -Treated with IV iron infusion and 2 units PRBC  -Hb improved and is now 7.5.  -Start FeSo4  -Repeat cbc in AM.    Menorrhagia  -Treatment as above  -Will need ambulatory referral to OB/GYN at  discharge    Essential hypertension  -BP consistently elevated in moderate range  -Initially held antihypertensives in the setting of bleeding and infection  -No BP meds listed on home bp list  -Check UDS  -Add norvasc 5mg daily and prn hydralazine.      VTE Risk Mitigation (From admission, onward)         Ordered     IP VTE LOW RISK PATIENT  Once         02/13/23 1440     Place sequential compression device  Until discontinued         02/13/23 1440                Discharge Planning   DANILO: 2/16/2023     Code Status: Full Code   Is the patient medically ready for discharge?:     Reason for patient still in hospital (select all that apply): Treatment  Discharge Plan A: Home with family                  Dillon Enriquez MD  Department of Hospital Medicine   [facility] - [place] - Telemetry

## 2023-02-15 NOTE — SUBJECTIVE & OBJECTIVE
Interval History: No acute events overnight.  Noted fever again at 5AM this morning.  States she feels well.  Had episode of mild diffuse back pain this morning - resolved at time of my visit - but recurred in afternoon.  Denied dysuria, cough, neck stiffness, vaginal discharge, rash, leg swelling/pain, nasal/ear congestion, diarrhea and unintentional weight loss.  Denies sick contact at home.  All questions answered and patient had no further complaints.     Objective:     Vital Signs (Most Recent):  Temp: 100.3 °F (37.9 °C) (02/15/23 1613)  Pulse: 80 (02/15/23 1613)  Resp: 18 (02/15/23 1613)  BP: (!) 170/93 (02/15/23 1613)  SpO2: 100 % (02/15/23 1613)   Vital Signs (24h Range):  Temp:  [98.8 °F (37.1 °C)-100.8 °F (38.2 °C)] 100.3 °F (37.9 °C)  Pulse:  [71-82] 80  Resp:  [18] 18  SpO2:  [98 %-100 %] 100 %  BP: (126-170)/(72-96) 170/93     Weight: 74 kg (163 lb 2.3 oz)  Body mass index is 23.41 kg/m².    Intake/Output Summary (Last 24 hours) at 2/15/2023 1626  Last data filed at 2/15/2023 1406  Gross per 24 hour   Intake 1500.33 ml   Output --   Net 1500.33 ml      Physical Exam  Vitals and nursing note reviewed.   Constitutional:       General: She is not in acute distress.     Appearance: She is not ill-appearing or toxic-appearing.   HENT:      Head: Normocephalic.      Nose: Nose normal.      Mouth/Throat:      Mouth: Mucous membranes are moist.   Eyes:      Extraocular Movements: Extraocular movements intact.      Conjunctiva/sclera: Conjunctivae normal.   Cardiovascular:      Rate and Rhythm: Normal rate and regular rhythm.      Pulses: Normal pulses.      Heart sounds: No murmur heard.  Pulmonary:      Effort: Pulmonary effort is normal. No respiratory distress.      Breath sounds: No wheezing.   Abdominal:      General: Bowel sounds are normal. There is no distension.      Tenderness: There is no abdominal tenderness.   Musculoskeletal:         General: No signs of injury. Normal range of motion.       Cervical back: Normal range of motion.      Right lower leg: No edema.      Left lower leg: No edema.      Comments: Could not illicit any back pain on exam - no point tenderness over spine and no CVA tenderness   Skin:     General: Skin is warm and dry.   Neurological:      General: No focal deficit present.      Mental Status: She is alert and oriented to person, place, and time.   Psychiatric:         Mood and Affect: Mood normal.         Thought Content: Thought content normal.       Significant Labs: All pertinent labs within the past 24 hours have been reviewed.    Significant Imaging: I have reviewed all pertinent imaging results/findings within the past 24 hours.

## 2023-02-15 NOTE — ASSESSMENT & PLAN NOTE
-Admitted to inpatient status  -On admit noted fever and tachycardia with leukocytosis (26K), elevated procalcitonin (6.5) and normal lactic acid  -CXR on admit and repeat 2/15 without evidence of pneumonia  -CTA chest on admit without PE, dissection or evidence of pneumonia  -Flu and covid swabs negative.  HIV negative.  -Blood culture negative  -UA mildly suggestive of UTI, but culture negative and she had no symptoms of UTI  -No rashes to suggest cellulitis  -No source of bacterial infection thus far identified, but presentation strongly suggestive of one.  Overall feeling better but complains of intermittent back pain which she thinks is due to the hospital bed.  Leukocytosis nearly resolved but still with fevers this morning.  -Differential includes occult abdominal or spinal infection, viral infection, occult VTE (d-dimer is not negative), occult malignancy.  Ongoing fevers may be due to blood products, but initial SIRS vitals were prior to any transfusion.  -Will check CT abdomen/pelvis, hepatitis panel and monospot.  -Continue vancomycin and cefepime for now.  -If further fevers overnight will consider MRI T/L spine and possibly ID consultation.

## 2023-02-15 NOTE — ASSESSMENT & PLAN NOTE
-Hb 6.5 on admit but dropped to 5.0.  -She is iron deficient and this is felt secondary to menorrhagia   -Treated with IV iron infusion and 2 units PRBC  -Hb improved and is now 7.5.  -Start FeSo4  -Repeat cbc in AM.

## 2023-02-15 NOTE — ASSESSMENT & PLAN NOTE
-BP consistently elevated in moderate range  -Initially held antihypertensives in the setting of bleeding and infection  -No BP meds listed on home bp list  -Check UDS  -Add norvasc 5mg daily and prn hydralazine.

## 2023-02-15 NOTE — PROGRESS NOTES
Pharmacokinetic Assessment Follow Up: IV Vancomycin    Vancomycin serum concentration assessment(s):    The trough level was drawn correctly and can be used to guide therapy at this time. The measurement is below the desired definitive target range of 10 to 20 mcg/mL.    Vancomycin Regimen Plan:    Change regimen to Vancomycin 1000 mg IV every 12 hours with next serum trough concentration measured at 0300 prior to 4th dose on 2/19    Drug levels (last 3 results):  Recent Labs   Lab Result Units 02/15/23  1414   Vancomycin-Trough ug/mL 3.5*       Pharmacy will continue to follow and monitor vancomycin.    Please contact pharmacy at extension [phone] for questions regarding this assessment.    Thank you for the consult,   Norma Schmid       Patient brief summary:  Gina Waller is a 42 y.o. female initiated on antimicrobial therapy with IV Vancomycin for treatment of  Opportunistic Infection Prophylaxis    The patient's current regimen is Vancomycin 1gm IV q12hr    Drug Allergies:   Review of patient's allergies indicates:  No Known Allergies    Actual Body Weight:   74 kg    Renal Function:   Estimated Creatinine Clearance: 98.1 mL/min (based on SCr of 0.8 mg/dL).,     Dialysis Method (if applicable):  N/A    CBC (last 72 hours):  Recent Labs   Lab Result Units 02/13/23  1259 02/13/23 2007 02/14/23  0421 02/14/23  1748 02/15/23  0418   WBC K/uL 25.61* 16.19* 16.24* 14.79* 11.44   Hemoglobin g/dL 6.5* 5.0* 6.0* 7.0* 7.5*   Hematocrit % 25.0* 19.0* 21.6* 24.1* 25.5*   Platelets K/uL 295 244 223 220 213   Gran % % 88.0* 87.0* 82.0* 79.5* 78.4*   Lymph % % 6.0* 5.6* 7.8* 9.9* 10.0*   Mono % % 0.0* 6.7 9.4 9.9 10.3   Eosinophil % % 0.0 0.0 0.2 0.1 0.3   Basophil % % 0.0 0.1 0.2 0.1 0.3   Differential Method  Manual Automated Automated Automated Automated       Metabolic Panel (last 72 hours):  Recent Labs   Lab Result Units 02/13/23  1259 02/13/23  1808 02/14/23  0421 02/15/23  0418 02/15/23  1030   Sodium mmol/L 136   --  138 138  --    Potassium mmol/L 3.8  --  3.5 3.7  --    Chloride mmol/L 103  --  109 110  --    CO2 mmol/L 16*  --  19* 20*  --    Glucose mg/dL 90  --  105 95  --    Glucose, UA   --  Negative  --   --  Negative   BUN mg/dL 15  --  15 9  --    Creatinine mg/dL 1.4  --  1.0 0.8  --    Albumin g/dL 3.8  --  2.9* 2.8*  --    Total Bilirubin mg/dL 0.8  --  1.4* 0.8  --    Alkaline Phosphatase U/L 71  --  77 76  --    AST U/L 33  --  23 53*  --    ALT U/L 22  --  13 36  --    Magnesium mg/dL  --   --  2.2 2.2  --    Phosphorus mg/dL  --   --  2.5* 2.6*  --        Vancomycin Administrations:  vancomycin given in the last 96 hours                     vancomycin 1,250 mg in dextrose 5 % (D5W) 250 mL IVPB (Vial-Mate) (mg) 1,250 mg New Bag 02/14/23 1720    vancomycin 1.5 g in dextrose 5 % 250 mL IVPB (ready to mix) (mg) 1,500 mg New Bag 02/13/23 1449                    Microbiologic Results:  Microbiology Results (last 7 days)       Procedure Component Value Units Date/Time    Blood culture x two cultures. Draw prior to antibiotics. [[id number]] Collected: 02/13/23 1421    Order Status: Completed Specimen: Blood from Peripheral, Hand, Left Updated: 02/15/23 1503     Blood Culture, Routine No Growth to date      No Growth to date      No Growth to date    Narrative:      Aerobic and anaerobic    Blood culture x two cultures. Draw prior to antibiotics. [[id number]] Collected: 02/13/23 1420    Order Status: Completed Specimen: Blood from Peripheral, Wrist, Right Updated: 02/15/23 1503     Blood Culture, Routine No Growth to date      No Growth to date      No Growth to date    Narrative:      Aerobic and anaerobic    Blood culture [[id number]] Collected: 02/15/23 0858    Order Status: Sent Specimen: Blood from Antecubital, Right Updated: 02/15/23 0937    Blood culture [[id number]] Collected: 02/15/23 0912    Order Status: Sent Specimen: Blood from Peripheral, Left Hand Updated: 02/15/23 0937    Urine culture [[id number]]  Collected: 02/13/23 1808    Order Status: Completed Specimen: Urine Updated: 02/15/23 0848     Urine Culture, Routine No significant growth    Narrative:      Specimen Source->Urine    Culture, Respiratory with Gram Stain [[id number]]     Order Status: No result Specimen: Respiratory     Respiratory Infection Panel (PCR), Nasopharyngeal [[id number]]     Order Status: No result Specimen: Nasopharyngeal Swab

## 2023-02-15 NOTE — PLAN OF CARE
Patient received 1UPRBC today for H/H of 6/21.  Will continue to monitor the patient's BP and for any signs of bleeding.  Problem: Adult Inpatient Plan of Care  Goal: Plan of Care Review  Outcome: Ongoing, Progressing     Problem: Bleeding (Sepsis/Septic Shock)  Goal: Absence of Bleeding  Outcome: Ongoing, Progressing  Intervention: Monitor and Manage Bleeding  Flowsheets (Taken 2/14/2023 6815)  Bleeding Precautions:   blood pressure closely monitored   monitored for signs of bleeding     Problem: Infection Progression (Sepsis/Septic Shock)  Goal: Absence of Infection Signs and Symptoms  Outcome: Ongoing, Progressing  Intervention: Initiate Sepsis Management  Flowsheets (Taken 2/14/2023 5870)  Infection Prevention:   rest/sleep promoted   hand hygiene promoted   environmental surveillance performed  Infection Management: aseptic technique maintained

## 2023-02-16 VITALS
WEIGHT: 163.13 LBS | TEMPERATURE: 99 F | OXYGEN SATURATION: 98 % | RESPIRATION RATE: 18 BRPM | SYSTOLIC BLOOD PRESSURE: 152 MMHG | BODY MASS INDEX: 23.35 KG/M2 | HEART RATE: 67 BPM | HEIGHT: 70 IN | DIASTOLIC BLOOD PRESSURE: 82 MMHG

## 2023-02-16 LAB
ALBUMIN SERPL BCP-MCNC: 2.9 G/DL (ref 3.5–5.2)
ALP SERPL-CCNC: 89 U/L (ref 55–135)
ALT SERPL W/O P-5'-P-CCNC: 38 U/L (ref 10–44)
AMPHET+METHAMPHET UR QL: NEGATIVE
ANION GAP SERPL CALC-SCNC: 10 MMOL/L (ref 8–16)
AST SERPL-CCNC: 40 U/L (ref 10–40)
BARBITURATES UR QL SCN>200 NG/ML: NEGATIVE
BASOPHILS # BLD AUTO: 0.06 K/UL (ref 0–0.2)
BASOPHILS NFR BLD: 0.7 % (ref 0–1.9)
BENZODIAZ UR QL SCN>200 NG/ML: NEGATIVE
BILIRUB SERPL-MCNC: 0.4 MG/DL (ref 0.1–1)
BUN SERPL-MCNC: 9 MG/DL (ref 6–20)
BZE UR QL SCN: NEGATIVE
CALCIUM SERPL-MCNC: 8.5 MG/DL (ref 8.7–10.5)
CANNABINOIDS UR QL SCN: NEGATIVE
CHLORIDE SERPL-SCNC: 108 MMOL/L (ref 95–110)
CO2 SERPL-SCNC: 22 MMOL/L (ref 23–29)
CREAT SERPL-MCNC: 0.8 MG/DL (ref 0.5–1.4)
CREAT UR-MCNC: 120.5 MG/DL (ref 15–325)
DIFFERENTIAL METHOD: ABNORMAL
EOSINOPHIL # BLD AUTO: 0.1 K/UL (ref 0–0.5)
EOSINOPHIL NFR BLD: 0.8 % (ref 0–8)
ERYTHROCYTE [DISTWIDTH] IN BLOOD BY AUTOMATED COUNT: 26.5 % (ref 11.5–14.5)
EST. GFR  (NO RACE VARIABLE): >60 ML/MIN/1.73 M^2
GLUCOSE SERPL-MCNC: 97 MG/DL (ref 70–110)
HCT VFR BLD AUTO: 27 % (ref 37–48.5)
HGB BLD-MCNC: 7.9 G/DL (ref 12–16)
IMM GRANULOCYTES # BLD AUTO: 0.1 K/UL (ref 0–0.04)
IMM GRANULOCYTES NFR BLD AUTO: 1.2 % (ref 0–0.5)
LYMPHOCYTES # BLD AUTO: 1.2 K/UL (ref 1–4.8)
LYMPHOCYTES NFR BLD: 14.9 % (ref 18–48)
MAGNESIUM SERPL-MCNC: 2 MG/DL (ref 1.6–2.6)
MCH RBC QN AUTO: 20.4 PG (ref 27–31)
MCHC RBC AUTO-ENTMCNC: 29.3 G/DL (ref 32–36)
MCV RBC AUTO: 70 FL (ref 82–98)
METHADONE UR QL SCN>300 NG/ML: NEGATIVE
MONOCYTES # BLD AUTO: 1 K/UL (ref 0.3–1)
MONOCYTES NFR BLD: 11.6 % (ref 4–15)
NEUTROPHILS # BLD AUTO: 5.9 K/UL (ref 1.8–7.7)
NEUTROPHILS NFR BLD: 70.8 % (ref 38–73)
NRBC BLD-RTO: 0 /100 WBC
OPIATES UR QL SCN: NEGATIVE
PCP UR QL SCN>25 NG/ML: NEGATIVE
PLATELET # BLD AUTO: 259 K/UL (ref 150–450)
PMV BLD AUTO: 10.8 FL (ref 9.2–12.9)
POTASSIUM SERPL-SCNC: 3.8 MMOL/L (ref 3.5–5.1)
PROT SERPL-MCNC: 7.2 G/DL (ref 6–8.4)
RBC # BLD AUTO: 3.87 M/UL (ref 4–5.4)
SODIUM SERPL-SCNC: 140 MMOL/L (ref 136–145)
TOXICOLOGY INFORMATION: NORMAL
WBC # BLD AUTO: 8.28 K/UL (ref 3.9–12.7)

## 2023-02-16 PROCEDURE — 25000003 PHARM REV CODE 250: Performed by: HOSPITALIST

## 2023-02-16 PROCEDURE — 80053 COMPREHEN METABOLIC PANEL: CPT | Performed by: STUDENT IN AN ORGANIZED HEALTH CARE EDUCATION/TRAINING PROGRAM

## 2023-02-16 PROCEDURE — 63600175 PHARM REV CODE 636 W HCPCS: Mod: TB,JG | Performed by: STUDENT IN AN ORGANIZED HEALTH CARE EDUCATION/TRAINING PROGRAM

## 2023-02-16 PROCEDURE — 85025 COMPLETE CBC W/AUTO DIFF WBC: CPT | Performed by: STUDENT IN AN ORGANIZED HEALTH CARE EDUCATION/TRAINING PROGRAM

## 2023-02-16 PROCEDURE — 63600175 PHARM REV CODE 636 W HCPCS: Performed by: HOSPITALIST

## 2023-02-16 PROCEDURE — 25500020 PHARM REV CODE 255: Performed by: HOSPITALIST

## 2023-02-16 PROCEDURE — 83735 ASSAY OF MAGNESIUM: CPT | Performed by: STUDENT IN AN ORGANIZED HEALTH CARE EDUCATION/TRAINING PROGRAM

## 2023-02-16 PROCEDURE — 36415 COLL VENOUS BLD VENIPUNCTURE: CPT | Performed by: STUDENT IN AN ORGANIZED HEALTH CARE EDUCATION/TRAINING PROGRAM

## 2023-02-16 RX ORDER — AMLODIPINE BESYLATE 5 MG/1
5 TABLET ORAL DAILY
Qty: 30 TABLET | Refills: 1 | Status: SHIPPED | OUTPATIENT
Start: 2023-02-17 | End: 2024-02-17

## 2023-02-16 RX ORDER — FERROUS SULFATE 325(65) MG
325 TABLET, DELAYED RELEASE (ENTERIC COATED) ORAL DAILY
Qty: 30 TABLET | Refills: 1 | Status: SHIPPED | OUTPATIENT
Start: 2023-02-16

## 2023-02-16 RX ORDER — AMOXICILLIN AND CLAVULANATE POTASSIUM 500; 125 MG/1; MG/1
1 TABLET, FILM COATED ORAL 3 TIMES DAILY
Qty: 21 TABLET | Refills: 0 | Status: SHIPPED | OUTPATIENT
Start: 2023-02-16 | End: 2023-02-23

## 2023-02-16 RX ADMIN — IBUPROFEN 400 MG: 400 TABLET, FILM COATED ORAL at 03:02

## 2023-02-16 RX ADMIN — VANCOMYCIN HYDROCHLORIDE 1000 MG: 1 INJECTION, POWDER, LYOPHILIZED, FOR SOLUTION INTRAVENOUS at 03:02

## 2023-02-16 RX ADMIN — FERROUS SULFATE TAB 325 MG (65 MG ELEMENTAL FE) 1 EACH: 325 (65 FE) TAB at 09:02

## 2023-02-16 RX ADMIN — AMLODIPINE BESYLATE 5 MG: 5 TABLET ORAL at 09:02

## 2023-02-16 RX ADMIN — IOHEXOL 75 ML: 350 INJECTION, SOLUTION INTRAVENOUS at 09:02

## 2023-02-16 RX ADMIN — CEFEPIME HYDROCHLORIDE 2 G: 2 INJECTION, SOLUTION INTRAVENOUS at 03:02

## 2023-02-16 NOTE — PLAN OF CARE
Problem: Adult Inpatient Plan of Care  Goal: Plan of Care Review  Outcome: Met  Goal: Patient-Specific Goal (Individualized)  Outcome: Met  Goal: Absence of Hospital-Acquired Illness or Injury  Outcome: Met  Goal: Optimal Comfort and Wellbeing  Outcome: Met  Goal: Readiness for Transition of Care  Outcome: Met     Problem: Fatigue  Goal: Improved Activity Tolerance  Outcome: Met     Problem: Adjustment to Illness (Sepsis/Septic Shock)  Goal: Optimal Coping  Outcome: Met     Problem: Bleeding (Sepsis/Septic Shock)  Goal: Absence of Bleeding  Outcome: Met     Problem: Glycemic Control Impaired (Sepsis/Septic Shock)  Goal: Blood Glucose Level Within Desired Range  Outcome: Met     Problem: Nutrition Impaired (Sepsis/Septic Shock)  Goal: Optimal Nutrition Intake  Outcome: Met

## 2023-02-16 NOTE — NURSING
Medications delivered from pharmacy. Patient escorted by PCT to family vehicle for discharge home. Patient accompanied by friend. No apparent distress noted.

## 2023-02-16 NOTE — DISCHARGE SUMMARY
[facility] Medicine  Discharge Summary      Patient Name: Gina Waller  MRN: [id number]  ROSALIE: [id number]  Patient Class: IP- Inpatient  Admission Date: 2/13/2023  Hospital Length of Stay: 3 days  Discharge Date and Time: No discharge date for patient encounter.  Attending Physician: Lexus Enriquez MD   Discharging Provider: Lexus Enriquez MD  Primary Care Provider: [facility]    Primary Care Team: Networked reference to record PCT     HPI:     Gina Waller is a 42 y.o. female who has a past medical history of Hypertension, menorrhagia, iron deficiency anemia come presented to the ED with CC of Fever. She complains of Flu like Symptoms- Fever, chills, myalgias, back pain, cold sweats, cough, rhinorrhea. She denies CP, AP, or SOB.  Patient has been taking Tylenol for fever, which helps temporarily.  Her friend is sick with an upper respiratory infection, with unknown microorganism.  Patient is COVID and flu negative in the emergency room.  She has 103 fever while in the ED. Urine pregnancy test is negative, patient states that she is on her menstrual cycle now and she has heavy bleeding.  Hemoglobin 6.5 on admission.  1 unit of blood ordered, and anemia labs ordered.  WBC count 26 K, patient started on broad-spectrum antibiotics, unknown source at this time.  Denies urinary symptoms.  Denies green sputum.  CTA performed in the ED, no signs of PE or infection.  Patient has 4/4 sirs: 103.2 F, , RR 22, WBC 26 K      Goals of Care Treatment Preferences:  Code Status: Full Code      Consults:   Consults (From admission, onward)        Status Ordering Provider     Inpatient consult to Social Work  Once        Provider:  (Not yet assigned)    Ordered LEXUS ENRIQUEZ        Hospital Course By Problem:   * SIRS (systemic inflammatory response syndrome)  -Admitted to inpatient status  -On admit noted fever and tachycardia with leukocytosis (26K), elevated procalcitonin (6.5) and  normal lactic acid  -CXR on admit and repeat 2/15 without evidence of pneumonia  -CTA chest on admit without PE, dissection or evidence of pneumonia  -Flu and covid swabs negative.  HIV negative.  Monospot negative  -Blood culture negative.    -UA mildly suggestive of UTI, but culture negative and she had no symptoms of UTI  -No rashes to suggest cellulitis  -CT Abdomen/pelvis showed ill-defined hypoenhancing regions at the superior pole of the right kidney and to a lesser degree at the superior pole of the left kidney.  Could represent sequela of pyelonephritis although nonspecific.  No hydronephrosis.  No organized fluid collection  -No source of bacterial infection identified with high confidence, but presentation strongly suggestive of one.  Most likely is that she had pylonephritis - which could be consistent with the mild back pain she experienced at times throughout her hospitalization  -She was treated with broad spectrum antibiotics, vancomycin and cefepime, and she has had a very nice recovery.  She has no pain, leukocytosis resolved and afebrile > 24 hours.  She is asking for discharge.  -Will continue augmentin for 7 days  -Recommend close follow up with pcp within 1 week.    Iron deficiency anemia  -Hb 6.5 on admit but dropped to 5.0.  -She is iron deficient and this is felt secondary to menorrhagia   -Treated with IV iron infusion and 2 units PRBC  -Hb improved and is now 7.9.  -Started FeSo4    Menorrhagia  -CT A/P showed fibroid uterus - discussed with patient  -Needs f/u with OB/GYN, but due to insurance she must get referral from her pcp    Essential hypertension  -BP consistently elevated in moderate range throughout much of her stay  -No BP meds listed on home bp list  -BP improved with norvasc - which we are continuing at discharge  -Recommend diet and exercise modifications and close f/u with pcp      Final Active Diagnoses:    Diagnosis Date Noted POA    PRINCIPAL PROBLEM:  SIRS (systemic  inflammatory response syndrome) [R65.10] 02/13/2023 Yes    Iron deficiency anemia [D50.9] 02/13/2023 Yes    Menorrhagia [N92.0] 02/13/2023 Yes    Essential hypertension [I10] 05/16/2016 Yes      Problems Resolved During this Admission:       Discharged Condition: stable    Disposition: Home or Self Care    Follow Up:   Follow-up Information     [facility] [place]. Call today.    Specialty: Obstetrics and Gynecology  Why: To schedule appointment.  Contact information:  [facility]  [place] [address]  [phone]             [facility] [place]. Call today.    Why: To schedule a hospital follow up  appointment within 7 days.  Contact information:  [address] [place] [address]  [phone]                       Patient Instructions:      Diet Cardiac     Notify your health care provider if you experience any of the following:  increased confusion or weakness     Notify your health care provider if you experience any of the following:  persistent dizziness, light-headedness, or visual disturbances     Notify your health care provider if you experience any of the following:  worsening rash     Notify your health care provider if you experience any of the following:  severe persistent headache     Notify your health care provider if you experience any of the following:  difficulty breathing or increased cough     Notify your health care provider if you experience any of the following:  severe uncontrolled pain     Notify your health care provider if you experience any of the following:  persistent nausea and vomiting or diarrhea     Notify your health care provider if you experience any of the following:  temperature >100.4     Activity as tolerated       Significant Diagnostic Studies: Labs:   BMP:   Recent Labs   Lab 02/15/23  0418 02/16/23  0352   GLU 95 97    140   K 3.7 3.8    108   CO2 20* 22*   BUN 9 9   CREATININE 0.8 0.8   CALCIUM 8.5* 8.5*   MG 2.2 2.0    , CMP   Recent Labs   Lab 02/15/23  0418 02/16/23  0352    140   K 3.7 3.8    108   CO2 20* 22*   GLU 95 97   BUN 9 9   CREATININE 0.8 0.8   CALCIUM 8.5* 8.5*   PROT 7.0 7.2   ALBUMIN 2.8* 2.9*   BILITOT 0.8 0.4   ALKPHOS 76 89   AST 53* 40   ALT 36 38   ANIONGAP 8 10   , CBC   Recent Labs   Lab 02/14/23  1748 02/15/23  0418 02/16/23  0352   WBC 14.79* 11.44 8.28   HGB 7.0* 7.5* 7.9*   HCT 24.1* 25.5* 27.0*    213 259   , INR No results found for: INR, PROTIME, Lipid Panel No results found for: CHOL, HDL, LDLCALC, TRIG, CHOLHDL, Troponin No results for input(s): TROPONINI in the last 168 hours. and A1C: No results for input(s): HGBA1C in the last 4320 hours.    Pending Diagnostic Studies:     None         Medications:  Reconciled Home Medications:      Medication List      START taking these medications    amLODIPine 5 MG tablet  Commonly known as: NORVASC  Take 1 tablet (5 mg total) by mouth once daily.  Start taking on: February 17, 2023     amoxicillin-clavulanate 500-125mg 500-125 mg Tab  Commonly known as: AUGMENTIN  Take 1 tablet (500 mg total) by mouth 3 (three) times daily. for 7 days     ferrous sulfate 325 (65 FE) MG EC tablet  Take 1 tablet (325 mg total) by mouth once daily.        STOP taking these medications    TYLENOL COLD AND FLU SEVERE 5--200 mg Tab  Generic drug: [id number]-guaifen            Indwelling Lines/Drains at time of discharge:   Lines/Drains/Airways     None                 Time spent on the discharge of patient: 35 minutes         Dillon Enriquez MD  Department of Hospital Medicine  [facility]

## 2023-02-16 NOTE — PROGRESS NOTES
Vancomycin consult follow-up:    Patient reviewed, renal function stable, no new levels, continue current therapy; Next levels due: trough due 2/17/2023 at 0300

## 2023-02-16 NOTE — PLAN OF CARE
Problem: Adult Inpatient Plan of Care  Goal: Plan of Care Review  Outcome: Ongoing, Progressing  Goal: Patient-Specific Goal (Individualized)  Outcome: Ongoing, Progressing  Goal: Absence of Hospital-Acquired Illness or Injury  Outcome: Ongoing, Progressing  Goal: Optimal Comfort and Wellbeing  Outcome: Ongoing, Progressing  Goal: Readiness for Transition of Care  Outcome: Ongoing, Progressing     Problem: Fatigue  Goal: Improved Activity Tolerance  Outcome: Ongoing, Progressing     Problem: Adjustment to Illness (Sepsis/Septic Shock)  Goal: Optimal Coping  Outcome: Ongoing, Progressing     Problem: Bleeding (Sepsis/Septic Shock)  Goal: Absence of Bleeding  Outcome: Ongoing, Progressing     Problem: Glycemic Control Impaired (Sepsis/Septic Shock)  Goal: Blood Glucose Level Within Desired Range  Outcome: Ongoing, Progressing     Problem: Infection Progression (Sepsis/Septic Shock)  Goal: Absence of Infection Signs and Symptoms  Outcome: Ongoing, Progressing     Problem: Nutrition Impaired (Sepsis/Septic Shock)  Goal: Optimal Nutrition Intake  Outcome: Ongoing, Progressing

## 2023-02-16 NOTE — PLAN OF CARE
Problem: Adult Inpatient Plan of Care  Goal: Plan of Care Review  Outcome: Ongoing, Progressing  Goal: Patient-Specific Goal (Individualized)  Outcome: Ongoing, Progressing  Goal: Absence of Hospital-Acquired Illness or Injury  Outcome: Ongoing, Progressing  Goal: Optimal Comfort and Wellbeing  Outcome: Ongoing, Progressing  Goal: Readiness for Transition of Care  Outcome: Ongoing, Progressing     Problem: Fatigue  Goal: Improved Activity Tolerance  Outcome: Ongoing, Progressing     Problem: Adjustment to Illness (Sepsis/Septic Shock)  Goal: Optimal Coping  Outcome: Ongoing, Progressing     Problem: Bleeding (Sepsis/Septic Shock)  Goal: Absence of Bleeding  Outcome: Ongoing, Progressing     Problem: Glycemic Control Impaired (Sepsis/Septic Shock)  Goal: Blood Glucose Level Within Desired Range  Outcome: Ongoing, Progressing

## 2023-02-16 NOTE — PLAN OF CARE
[facility] - Telemetry  Discharge Final Note    Primary Care Provider: [facility]    Expected Discharge Date: 2/16/2023    CM notified nurse, Chastity that pt is ready for discharge from CM standpoint. All CM needs met.    Final Discharge Note (most recent)       Final Note - 02/16/23 1258          Final Note    Assessment Type Final Discharge Note (P)      Anticipated Discharge Disposition Home or Self Care (P)      What phone number can be called within the next 1-3 days to see how you are doing after discharge? [phone] (P)      Hospital Resources/Appts/Education Provided Appointments scheduled and added to AVS;Appointments scheduled by Navigator/Coordinator (P)         Post-Acute Status    Coverage Healthy Blue- Medicaid (P)      Discharge Delays None known at this time (P)                      Important Message from Medicare             Contact Info       [facility]  [place]   Specialty: Obstetrics and Gynecology    [address] [place] [id number]   Phone: [phone]       Next Steps: Call today    Instructions: To schedule appointment. [phone].    [facility]   Relationship: PCP - General    [address]  [facility] [address]   Phone: [phone]       Next Steps: Call today    Instructions: To schedule a hospital follow up  appointment within 7 days.

## 2023-02-16 NOTE — NURSING
Discharge instructions given to patient at bedside. Patient verbalized understanding of instructions. Patient states willingness to comply. Saline lock removed. Tele monitoring removed.  Patient is waiting on medications to be delivered by pharmacy, will notify nurse when have arrived and ride is here to pick her up.

## 2023-02-16 NOTE — NURSING
Patient informed about CT test, not to eat or drink anything else until test completed. Patient agrees.

## 2023-02-16 NOTE — DISCHARGE INSTRUCTIONS
We believe your infection was most likely caused by a kidney infection (pyelonephritis).  Continue augmentin as prescribed.  Your blood pressure has been notably elevated often during your stay which may be consistent with hypertension.  We have started a once daily blood pressure medication - amlodopine.  Monitor your blood pressure at home and if the top number is 120 or less, then skip the dose of amlodopine for that day. Follow up with your pcp for ongoing management of your blood pressure medication.  Hopefully with diet changes and increased exercise you will not need to stay on norvasc longterm.  Take all medications as prescribed.  Eat a strict low salt cardiac diet.  Follow up with your physicians as scheduled - pcp within 1 week and your OB/GYN physician within 2 weeks.  Thank you for trusting [facility] and Dr. Enriquez with your care.  We are honored that you entrusted us with your healthcare needs. Your satisfaction is very important to us and we hope you have been very pleased with your experience at [facility]. After your discharge you may receive a survey asking you to rate your hospital experience. We encourage you to take the time to complete the survey as your feedback allows us to identify areas for improvement as well as recognize our staff.   We hope that you have received the very best care possible during your hospitalization at [facility], as your satisfaction is our top priority.

## 2023-02-16 NOTE — NURSING
Patient to CT via wc as ordered. Patient awake, alert, oriented without c/o discomfort at this time. No apparent distress noted.

## 2023-02-17 ENCOUNTER — PATIENT OUTREACH (OUTPATIENT)
Dept: ADMINISTRATIVE | Facility: CLINIC | Age: 43
End: 2023-02-17
Payer: MEDICAID

## 2023-02-17 LAB
BACTERIA BLD CULT: NORMAL
BACTERIA BLD CULT: NORMAL

## 2023-02-17 NOTE — PROGRESS NOTES
C3 nurse offered to scheduled [facility] hospital follow-up discharge follow-up call. The patient declined appointment at this time.     Please do not reply to this message, as this inbox is not routinely monitored.

## 2023-02-19 LAB
BACTERIA BLD CULT: NORMAL
BACTERIA BLD CULT: NORMAL